# Patient Record
Sex: FEMALE | Race: WHITE | Employment: FULL TIME | URBAN - METROPOLITAN AREA
[De-identification: names, ages, dates, MRNs, and addresses within clinical notes are randomized per-mention and may not be internally consistent; named-entity substitution may affect disease eponyms.]

---

## 2017-03-28 ENCOUNTER — TRANSFERRED RECORDS (OUTPATIENT)
Dept: HEALTH INFORMATION MANAGEMENT | Facility: CLINIC | Age: 47
End: 2017-03-28

## 2017-04-10 ENCOUNTER — TELEPHONE (OUTPATIENT)
Dept: FAMILY MEDICINE | Facility: CLINIC | Age: 47
End: 2017-04-10

## 2017-04-10 DIAGNOSIS — Z86.19 H/O COLD SORES: Primary | ICD-10-CM

## 2017-04-10 RX ORDER — PENCICLOVIR 10 MG/G
CREAM TOPICAL
Refills: 6 | Status: CANCELLED | OUTPATIENT
Start: 2017-04-10

## 2017-04-10 NOTE — TELEPHONE ENCOUNTER
Reason for Call:  Medication or medication refill:    Do you use a [facility]?  Name of the pharmacy and phone number for the current request:  [facility]    Name of the medication requested: Denavir cream, patient states a she feels a cold sore coming on    Other request:     Can we leave a detailed message on this number? YES    Phone number patient can be reached at: Home number on file [phone] (home)    Best Time: any    Call taken on 4/10/2017 at 10:42 AM by Lary Jonas

## 2017-04-11 RX ORDER — PENCICLOVIR 10 MG/G
CREAM TOPICAL
Qty: 1.5 G | Refills: 3 | Status: SHIPPED | OUTPATIENT
Start: 2017-04-11

## 2017-04-11 NOTE — TELEPHONE ENCOUNTER
[facility] calling to state they are not able to get the denavir cream from their supplier.  Please advise  [phone]  Thank you,  Lary Jonas  Patient Representative

## 2017-04-11 NOTE — TELEPHONE ENCOUNTER
Patient has valtrex prescriptions on file.  She should take this as it will be as or more effective than the denavir cream.  Will send this to pharmacy on file, however.  Will have staff notify patient.     Obinna Callahan MD

## 2017-04-11 NOTE — TELEPHONE ENCOUNTER
Called pt and informed her of the below msg. She states that she took all four pills of the Valtrex yesterday but today when she woke up she has another big cold sore on her lip. She want's to know if she should take another dose of the valtrex. She has an interview and would like this gone.  Agnes Jacob MA

## 2017-04-11 NOTE — TELEPHONE ENCOUNTER
Is there anything else patient can use since they are not able to get this cream from their supplier?    Justyna Mcintyre, CMA

## 2017-04-11 NOTE — TELEPHONE ENCOUNTER
[facility] called again and states to disregard their message.  Thank you,  Lary Jonas  Patient Representative

## 2017-05-02 ENCOUNTER — OFFICE VISIT (OUTPATIENT)
Dept: FAMILY MEDICINE | Facility: CLINIC | Age: 47
End: 2017-05-02
Payer: COMMERCIAL

## 2017-05-02 ENCOUNTER — HOSPITAL ENCOUNTER (OUTPATIENT)
Dept: GENERAL RADIOLOGY | Facility: CLINIC | Age: 47
Discharge: HOME | End: 2017-05-02
Attending: FAMILY MEDICINE | Admitting: FAMILY MEDICINE
Payer: COMMERCIAL

## 2017-05-02 VITALS
TEMPERATURE: 98.7 F | SYSTOLIC BLOOD PRESSURE: 116 MMHG | RESPIRATION RATE: 18 BRPM | OXYGEN SATURATION: 98 % | HEIGHT: 65 IN | HEART RATE: 82 BPM | DIASTOLIC BLOOD PRESSURE: 72 MMHG | WEIGHT: 182 LBS | BODY MASS INDEX: 30.32 KG/M2

## 2017-05-02 DIAGNOSIS — S93.492A SPRAIN OF OTHER LIGAMENT OF LEFT ANKLE, INITIAL ENCOUNTER: Primary | ICD-10-CM
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
## 2017-05-02 DIAGNOSIS — F32.5 MAJOR DEPRESSIVE DISORDER WITH SINGLE EPISODE, IN FULL REMISSION (H): ICD-10-CM
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
## 2017-05-02 DIAGNOSIS — I47.29 PAROXYSMAL VENTRICULAR TACHYCARDIA (H): ICD-10-CM

## 2017-05-02 PROCEDURE — 73610 X-RAY EXAM OF ANKLE: CPT | Mod: TC

## 2017-05-02 PROCEDURE — 99213 OFFICE O/P EST LOW 20 MIN: CPT | Performed by: FAMILY MEDICINE

## 2017-05-02 ASSESSMENT — PAIN SCALES - GENERAL: PAINLEVEL: SEVERE PAIN (7)

## 2017-05-02 NOTE — PROGRESS NOTES
SUBJECTIVE:                                                    Sarika VASQUEZ is a 47 year old female who presents to clinic today for the following health issues:    Musculoskeletal problem/pain      Duration: fell at home last night     Description  Location: left ankle swelling, dis colored.     Intensity:  7/10    Accompanying signs and symptoms: swelling and discoloration of ankle    History  Previous similar problem: no   Previous evaluation:  none    Precipitating or alleviating factors:  Trauma or overuse: YES  Aggravating factors include: standing, walking and climbing stairs    Therapies tried and outcome: rest/inactivity, ice, immobilization, support wrap and acetaminophen     She states that she was walking down her stairs into her garage, and missed a step when she was walking. She is able to hobble on the foot, but is very painful to walk on. Shortly after the injury she immediately wrapped it with an ace bandage and placed ice on it frequently.      Endorses she still gets intermittent tachycardia. Just did a Holter monitor through [facility] as recent as March 2017. She almost passed out while she was at work and they wanted her to get accessed further. She did not actually pass out but was very dizzy, diaphoretic, and as though she was going to faint. She was told the preliminary results showed there was no abnormal findings.     Her depression is doing well currently. She has no complaints.     PHQ-9 score:    PHQ-9 SCORE 5/20/2015 7/19/2016 5/2/2017   Total Score 5 - -   Total Score - 3 3     Problem list and histories reviewed & adjusted, as indicated.  Additional history: as documented    Reviewed and updated as needed this visit by clinical staff  Tobacco  Allergies  Meds       Reviewed and updated as needed this visit by Provider       ROS:  CV: As above.   Musculoskeletal: as above    Remaining 10 point ROS of systems including Constitutional, Eyes, Respiratory, Cardiovascular,  "Gastroenterology, Genitourinary, Integumentary, Muscularskeletal, Psychiatric were all negative except for pertinent positives noted in my HPI.    This document serves as a record of the services and decisions personally performed by Obinna Callahan MD. It was created on his/her behalf by José Luis Contreras, a trained medical scribe. The creation of this document is based on the provider's statements to the medical scribe.     José Luis Contreras May 2, 2017 11:03 AM    OBJECTIVE:                                                    /72  Pulse 82  Temp 98.7  F (37.1  C) (Temporal)  Resp 18  Ht 1.651 m (5' 5\")  Wt 82.6 kg (182 lb)  LMP 04/10/2013  SpO2 98%  Breastfeeding? No  BMI 30.29 kg/m2  Body mass index is 30.29 kg/(m^2).  GENERAL APPEARANCE: healthy, alert and no distress  MS: Large amount of swelling on lateral left ankle. Pain with minimal passive range of motion with inversion.   SKIN: bruising noted on the lateral aspect on the Left foot.     XR ANKLE LEFT 3 Views 05/02/17:  ''Preliminary Impression: Question mild lateral ankle sprain. No fracture or  malalignment.''        ASSESSMENT/PLAN:                                                        ICD-10-CM    1. Sprain of other ligament of left ankle, initial encounter S93.492A    2. Fall down stairs, initial encounter W10.8XXA XR Ankle Left G/E 3 Views   3. Paroxysmal ventricular tachycardia (H) I47.2    4. Major depressive disorder with single episode, in full remission (H) F32.5      PLAN:  1.  Advised patient to wear a boot for 7 to 10 days 24 hrs daily including it being in bed. No weight bearing for 3-4 days. When feeling that she can start to put weight on it, then slowly increase weight bearing over the next week until she can feel full weight bearing. She is interested in getting a wheeled scooter for her ankle.     2. Discussed possible or eventual physical therapy for the left ankle. Advised Ibuprofen for pain management now, and " switched to Tylenol after 2-3 days.     Follow-up with provider: Next week for re-evaluation of left ankle.     The information in this document, created by the medical scribe José Luis Contreras for me, accurately reflects the services I personally performed and the decisions made by me. I have reviewed and approved this document for accuracy prior to leaving the patient care area.    Obinna Callahan MD   [facility]

## 2017-05-02 NOTE — NURSING NOTE
"Chief Complaint   Patient presents with     Trauma     Left ankle swelling, Pt missed a step at home last evening and fell, had immediate swelling.        Initial /72  Pulse 82  Temp 98.7  F (37.1  C) (Temporal)  Resp 18  Ht 5' 5\" (1.651 m)  Wt 182 lb (82.6 kg)  LMP 04/10/2013  SpO2 98%  Breastfeeding? No  BMI 30.29 kg/m2 Estimated body mass index is 30.29 kg/(m^2) as calculated from the following:    Height as of this encounter: 5' 5\" (1.651 m).    Weight as of this encounter: 182 lb (82.6 kg).  Medication Reconciliation: complete    "

## 2017-05-02 NOTE — LETTER
REPORT OF WORKABILITY    [address] [address]  Phone: [phone]  Fax: [phone]      Employee Name: Sarika VASQUEZ      : 1970     #: [id number]    Work related injury: No  Employer at time of injury: [employer]  Employer contact & phone: Dulce Lozada  Employed elsewhere? No  Workers' Compensation Carrier/Managed Care Plan:      Today's date: 2017  Date of injury: 2017 in evening at home   Date of first visit: 2017 at 10:00 am.     Provider Evaluation: Please fill in as needed.  Please give copy to employee for employer.    1. Diagnosis: Lateral left ankle sprain     2. Treatment: Wear boot 7-10 days, no weight bearing, Use crutches.     3. Medication:     NOTE: When ordering a medication, [place] Rules require [place] Comp or WC on prescriptions.    4. No work from 2017 to 2017.    5. Return to work date: 2017      WITH RESTRICTIONS? Yes, with work restrictions: * Other: Wearing boot   DURATION OF LIMITATIONS: until she is seen on 17 for a recheck.        RESTRICTIONS: Unlimited unless listed.  Restrictions apply to home and leisure also.  If work restrictions is not available, the employee is totally disabled.    Next appointment: 2017 at 10:00 am     Maximum Medical Improvement (Date):   Any Permanent Partial Disability? 0%  Provider comments:     Medical Examiner Name: Obinna Callahan MD  License or registration: [id number]        CC: Employer, Managed Care Plan/Payor, Patient

## 2017-05-02 NOTE — MR AVS SNAPSHOT
After Visit Summary   5/2/2017    Sarika VASQUEZ    MRN: [id number]           Patient Information     Date Of Birth          1970        Visit Information        Provider Department      5/2/2017 10:30 AM Obinna Callahan MD [facility]        Today's Diagnoses     Sprain of other ligament of left ankle, initial encounter    -  1    Fall down stairs, initial encounter        Paroxysmal ventricular tachycardia (H)        Major depressive disorder with single episode, in full remission (H)           Follow-ups after your visit        Your next 10 appointments already scheduled     May 11, 2017  9:30 AM CDT   MA SCREENING DIGITAL BILATERAL with PHMA1   [facility] ([facility])    [address] [place] [id number] [phone]           Do not use any powder, lotion or deodorant under your arms or on your breast. If you do, we will ask you to remove it before your exam.  Wear comfortable, two-piece clothing.  If you have any allergies, tell your care team.  Bring any previous mammograms from other facilities or have them mailed to the breast center.            May 11, 2017 10:00 AM CDT   Office Visit with Obinna Callahan MD   [facility]    [address] [place] [id number] [phone]           Bring a current list of meds and any records pertaining to this visit.  For Physicals, please bring immunization records and any forms needing to be filled out.  Please arrive 10 minutes early to complete paperwork.              Who to contact     If you have questions or need follow up information about today's clinic visit or your schedule please contact [facility] directly at [phone].  Normal or non-critical lab and imaging results will be communicated to you by MyChart, letter or phone within 4 business days after the clinic has received the results. If  "you do not hear from us within 7 days, please contact the clinic through [email or web address] or phone. If you have a critical or abnormal lab result, we will notify you by phone as soon as possible.  Submit refill requests through [email or web address] or call your pharmacy and they will forward the refill request to us. Please allow 3 business days for your refill to be completed.          Additional Information About Your Visit        [facility] Information     [email or web address] gives you secure access to your electronic health record. If you see a primary care provider, you can also send messages to your care team and make appointments. If you have questions, please call your primary care clinic.  If you do not have a primary care provider, please call [phone] and they will assist you.        Care EveryWhere ID     This is your Care EveryWhere ID. This could be used by other organizations to access your [facility] medical records  [id number]        Your Vitals Were     Pulse Temperature Respirations Height Last Period Pulse Oximetry    82 98.7  F (37.1  C) (Temporal) 18 5' 5\" (1.651 m) 04/10/2013 98%    Breastfeeding? BMI (Body Mass Index)                No 30.29 kg/m2           Blood Pressure from Last 3 Encounters:   05/02/17 116/72   07/19/16 126/72   05/20/15 116/80    Weight from Last 3 Encounters:   05/02/17 182 lb (82.6 kg)   07/19/16 182 lb (82.6 kg)   05/20/15 198 lb (89.8 kg)               Primary Care Provider Office Phone # Fax #    Obinna Callahan [phone]       [facility] [place] [address] [facility]   [facility] [address]        Thank you!     Thank you for choosing [facility]  for your care. Our goal is always to provide you with excellent care. Hearing back from our patients is one way we can continue to improve our services. Please take a few minutes to complete the written survey that you may receive in the mail after your visit with us. Thank you!             Your Updated Medication " List - Protect others around you: Learn how to safely use, store and throw away your medicines at www.disposemymeds.org.          This list is accurate as of: 5/2/17 11:59 PM.  Always use your most recent med list.                   Brand Name Dispense Instructions for use    estradiol 2 MG tablet    ESTRACE    90 tablet    TAKE ONE TABLET BY MOUTH EVERY DAY (PATIENT NEEDS TO BE SEEN FOR FURTHER REFILLS)       penciclovir 1 % cream    DENAVIR    1.5 g    Apply topically every 2 hours (while awake)       prenatal multivitamin  plus iron 27-0.8 MG Tabs per tablet      Take 1 tablet by mouth daily       valACYclovir 1000 mg tablet    VALTREX    4 tablet    Take 2 tablets (2,000 mg) every 12 hours for 1 day.       VITAMIN D (CHOLECALCIFEROL) PO      Take 2,000 Units by mouth daily

## 2017-05-02 NOTE — LETTER
REPORT OF WORKABILITY    [address] [address]  Phone: [phone]  Fax: [phone]      Employee Name: Sarika VASQUEZ      : 1970     #: [id number]    Work related injury: No  Employer at time of injury: [employer]  Employer contact & phone: Dulce Lozada  [phone] and Wendy [phone]  Employed elsewhere? No  Workers' Compensation Carrier/Managed Care Plan:      Today's date: 2017  Date of injury: 2017 in evening at home   Date of first visit: 2017 at 10:00 am.     Provider Evaluation: Please fill in as needed.  Please give copy to employee for employer.    1. Diagnosis: Lateral left ankle sprain     2. Treatment: Wear boot , no weight bearing, Use crutches until 17.     3. Medication:     NOTE: When ordering a medication, [place] Rules require [place] or WC on prescriptions.    4. No work from 2017 to 2017    5. Return to work date: 2017      WITH RESTRICTIONS? Yes, with work restrictions: * Other: Wearing boot, able to walk with boot on, no crutches.   DURATION OF LIMITATIONS: until she is seen on 17 for a recheck.        RESTRICTIONS: Unlimited unless listed.  Restrictions apply to home and leisure also.  If work restrictions is not available, the employee is totally disabled.    Next appointment: 2017 at 10:00 am     Maximum Medical Improvement (Date):   Any Permanent Partial Disability? 0%  Provider comments:     Medical Examiner Name: Obinna Callahan MD  License or registration: [id number]      CC: Employer, Managed Care Plan/Payor, Patient

## 2017-05-03 ASSESSMENT — PATIENT HEALTH QUESTIONNAIRE - PHQ9: SUM OF ALL RESPONSES TO PHQ QUESTIONS 1-9: 3

## 2017-05-11 ENCOUNTER — OFFICE VISIT (OUTPATIENT)
Dept: FAMILY MEDICINE | Facility: CLINIC | Age: 47
End: 2017-05-11
Payer: COMMERCIAL

## 2017-05-11 ENCOUNTER — HOSPITAL ENCOUNTER (OUTPATIENT)
Dept: MAMMOGRAPHY | Facility: CLINIC | Age: 47
Discharge: HOME | End: 2017-05-11
Attending: FAMILY MEDICINE | Admitting: FAMILY MEDICINE
Payer: COMMERCIAL

## 2017-05-11 VITALS
BODY MASS INDEX: 30.32 KG/M2 | RESPIRATION RATE: 18 BRPM | SYSTOLIC BLOOD PRESSURE: 126 MMHG | TEMPERATURE: 98.2 F | DIASTOLIC BLOOD PRESSURE: 70 MMHG | HEIGHT: 65 IN | WEIGHT: 182 LBS | OXYGEN SATURATION: 99 % | HEART RATE: 76 BPM

## 2017-05-11 DIAGNOSIS — Z12.31 VISIT FOR SCREENING MAMMOGRAM: ICD-10-CM

## 2017-05-11 DIAGNOSIS — S93.402A SPRAIN OF LEFT ANKLE, UNSPECIFIED LIGAMENT, INITIAL ENCOUNTER: Primary | ICD-10-CM

## 2017-05-11 PROCEDURE — 99213 OFFICE O/P EST LOW 20 MIN: CPT | Performed by: FAMILY MEDICINE

## 2017-05-11 PROCEDURE — G0202 SCR MAMMO BI INCL CAD: HCPCS

## 2017-05-11 ASSESSMENT — PAIN SCALES - GENERAL: PAINLEVEL: MILD PAIN (2)

## 2017-05-11 NOTE — PROGRESS NOTES
"  SUBJECTIVE:                                                    Sarika VASQUEZ is a 47 year old female who presents to clinic today for the following health issues:      Recheck left ankle today.  She is walking in the boot.  No crutches since Monday 5-8-17.     Problem list and histories reviewed & adjusted, as indicated.  Additional history: as documented    Reviewed and updated as needed this visit by clinical staff  Tobacco  Allergies  Meds  Problems  Soc Hx      Reviewed and updated as needed this visit by Provider  Allergies  Meds  Problems         Here today to follow-up on left ankle sprain.  Was seen 9 days ago and placed in boot and on crutches initially.  Has vastly improved and was able to get to point 3 days ago that she no longer needed crutches.      Ankle starting to feel better and is less swollen.  Still somewhat sore now that boot is off.      ROS:  Negative except as noted above.      OBJECTIVE:                                                    /70  Pulse 76  Temp 98.2  F (36.8  C) (Temporal)  Resp 18  Ht 5' 5\" (1.651 m)  Wt 182 lb (82.6 kg)  LMP 04/10/2013  SpO2 99%  Breastfeeding? No  BMI 30.29 kg/m2  Body mass index is 30.29 kg/(m^2).  GENERAL APPEARANCE: healthy, alert and no distress  MS: left ankle has some swelling along lateral aspect and is tender to palpation below the malleolus.  Pain with inversion of the ankle.  Negative anterior drawer test.  No pain with plantar or dorsiflexion of the foot and 5/5 strength in these directions.            ASSESSMENT/PLAN:                                                        ICD-10-CM    1. Sprain of left ankle, unspecified ligament, initial encounter S93.402A      PLAN:  1.  Will have her use ace wrap and then place gel splint over top of that and continue to walk on it as tolerated with these supports for next few weeks.  Once swelling and pain resolved, may discontinue use of these.    2.  If her ankle feels loose or " unstable, she would benefit from physical therapy.  She can contact me for referral if she wishes to proceed.       Follow up with Provider - as needed.    I spent >15 minutes of face to face time with the patient, >50% of which was spent counseling and coordination of care regarding long term management of ankle sprain.      Obinna Callahan MD   [facility]

## 2017-05-11 NOTE — NURSING NOTE
"Chief Complaint   Patient presents with     RECHECK     ankle       Initial /70  Pulse 76  Temp 98.2  F (36.8  C) (Temporal)  Resp 18  Ht 5' 5\" (1.651 m)  Wt 182 lb (82.6 kg)  LMP 04/10/2013  SpO2 99%  Breastfeeding? No  BMI 30.29 kg/m2 Estimated body mass index is 30.29 kg/(m^2) as calculated from the following:    Height as of this encounter: 5' 5\" (1.651 m).    Weight as of this encounter: 182 lb (82.6 kg).  Medication Reconciliation: complete    "

## 2017-05-11 NOTE — LETTER
REPORT OF WORKABILITY    [address] [address]  Phone: [phone]  Fax: [phone]      Employee Name: Sarika VASQUEZ      : 1970     #: [id number]    Work related injury: No  Employer at time of injury: [employer]  Employer contact & phone: Dulce Lozada  [phone] and Wendy [phone]  Employed elsewhere? No  Workers' Compensation Carrier/Managed Care Plan:      Today's date: May 11, 2017   Date of injury: 2017 in evening at home   Date of first visit: 2017 at 10:00 am.     Provider Evaluation: Please fill in as needed.  Please give copy to employee for employer.    1. Diagnosis: Lateral left ankle sprain     2. Treatment:  Currently required to ace wrap ankle and use gel splint.  Will be able to decrease use of this as tolerated     3. Medication: none    NOTE: When ordering a medication, [place] Rules require [place] Comp or WC on prescriptions.    4. Return to work date: May 11, 2017      WITH RESTRICTIONS? Yes, with work restrictions: needs to be able to wear gel splint while needed.  She can discontinue device as she sees fit.    RESTRICTIONS: Unlimited unless listed.  Restrictions apply to home and leisure also.  If work restrictions is not available, the employee is totally disabled.    Next appointment: as needed     Maximum Medical Improvement (Date): n/a  Any Permanent Partial Disability? 0%  Provider comments: none    Medical Examiner Name: Obinna Callahan MD  License or registration: [id number]      CC: Employer, Managed Care Plan/Payor, Patient

## 2017-05-11 NOTE — MR AVS SNAPSHOT
"              After Visit Summary   5/11/2017    Sarika VASQUEZ    MRN: [id number]           Patient Information     Date Of Birth          1970        Visit Information        Provider Department      5/11/2017 10:00 AM Obinna Callahan MD [facility]        Today's Diagnoses     Sprain of left ankle, unspecified ligament, initial encounter    -  1       Follow-ups after your visit        Who to contact     If you have questions or need follow up information about today's clinic visit or your schedule please contact [facility] directly at [phone].  Normal or non-critical lab and imaging results will be communicated to you by [employer], letter or phone within 4 business days after the clinic has received the results. If you do not hear from us within 7 days, please contact the clinic through Real[employer] or phone. If you have a critical or abnormal lab result, we will notify you by phone as soon as possible.  Submit refill requests through [employer] or call your pharmacy and they will forward the refill request to us. Please allow 3 business days for your refill to be completed.          Additional Information About Your Visit        MyChart Information     [employer] gives you secure access to your electronic health record. If you see a primary care provider, you can also send messages to your care team and make appointments. If you have questions, please call your primary care clinic.  If you do not have a primary care provider, please call [phone] and they will assist you.        Care EveryWhere ID     This is your Care EveryWhere ID. This could be used by other organizations to access your [facility] medical records  [id number]        Your Vitals Were     Pulse Temperature Respirations Height Last Period Pulse Oximetry    76 98.2  F (36.8  C) (Temporal) 18 5' 5\" (1.651 m) 04/10/2013 99%    Breastfeeding? BMI (Body Mass Index)                No 30.29 kg/m2           " Blood Pressure from Last 3 Encounters:   05/11/17 126/70   05/02/17 116/72   07/19/16 126/72    Weight from Last 3 Encounters:   05/11/17 182 lb (82.6 kg)   05/02/17 182 lb (82.6 kg)   07/19/16 182 lb (82.6 kg)              Today, you had the following     No orders found for display       Primary Care Provider Office Phone # Fax #    Obinna Callahan [phone]       [facility] [place] [address] [facility] [address] [place] [address]        Thank you!     Thank you for choosing [facility]  for your care. Our goal is always to provide you with excellent care. Hearing back from our patients is one way we can continue to improve our services. Please take a few minutes to complete the written survey that you may receive in the mail after your visit with us. Thank you!             Your Updated Medication List - Protect others around you: Learn how to safely use, store and throw away your medicines at www.disposemymeds.org.          This list is accurate as of: 5/11/17  2:15 PM.  Always use your most recent med list.                   Brand Name Dispense Instructions for use    estradiol 2 MG tablet    ESTRACE    90 tablet    TAKE ONE TABLET BY MOUTH EVERY DAY (PATIENT NEEDS TO BE SEEN FOR FURTHER REFILLS)       penciclovir 1 % cream    DENAVIR    1.5 g    Apply topically every 2 hours (while awake)       prenatal multivitamin  plus iron 27-0.8 MG Tabs per tablet      Take 1 tablet by mouth daily       valACYclovir 1000 mg tablet    VALTREX    4 tablet    Take 2 tablets (2,000 mg) every 12 hours for 1 day.       VITAMIN D (CHOLECALCIFEROL) PO      Take 2,000 Units by mouth daily

## 2017-06-22 ENCOUNTER — TELEPHONE (OUTPATIENT)
Dept: FAMILY MEDICINE | Facility: CLINIC | Age: 47
End: 2017-06-22

## 2017-06-22 DIAGNOSIS — M25.572 LEFT ANKLE PAIN: Primary | ICD-10-CM

## 2017-06-22 NOTE — TELEPHONE ENCOUNTER
Reason for Call: Request for an order or referral:    Order or referral being requested: MRI. Left ankle pain    Date needed: as soon as possible    Has the patient been seen by the PCP for this problem? YES    Additional comments: pt stated her ins ends June 30    Phone number Patient can be reached at:      Best Time:      Can we leave a detailed message on this number?  YES    Call taken on 6/22/2017 at 2:31 PM by Heather Grider

## 2017-06-22 NOTE — TELEPHONE ENCOUNTER
Per RUDY pt to see Reggie. I notified pt and she can call speciality. Sending to Mitra to put in referral. JAYCEE

## 2017-06-28 ENCOUNTER — RADIANT APPOINTMENT (OUTPATIENT)
Dept: GENERAL RADIOLOGY | Facility: CLINIC | Age: 47
End: 2017-06-28
Attending: PODIATRIST
Payer: COMMERCIAL

## 2017-06-28 ENCOUNTER — OFFICE VISIT (OUTPATIENT)
Dept: PODIATRY | Facility: CLINIC | Age: 47
End: 2017-06-28
Payer: COMMERCIAL

## 2017-06-28 VITALS — WEIGHT: 182 LBS | BODY MASS INDEX: 30.32 KG/M2 | HEIGHT: 65 IN

## 2017-06-28 DIAGNOSIS — S93.402D MODERATE LEFT ANKLE SPRAIN, SUBSEQUENT ENCOUNTER: Primary | ICD-10-CM

## 2017-06-28 DIAGNOSIS — M25.572 LEFT ANKLE PAIN: ICD-10-CM

## 2017-06-28 PROCEDURE — 73610 X-RAY EXAM OF ANKLE: CPT | Mod: LT

## 2017-06-28 PROCEDURE — 99203 OFFICE O/P NEW LOW 30 MIN: CPT | Performed by: PODIATRIST

## 2017-06-28 NOTE — PROGRESS NOTES
PATIENT HISTORY:  Sarika Chew is a 47 year old female who presents to clinic for a painful left ankle.  The patient describes the pain as sharp stabbign.  The patient relates pain is located over the outside of the left ankle.  The patient relates the pain has been present for the past several weeks ever since spraining the left ankle..  The patient relates pain with ambulation.  The patient has tried an over the counter ankle brace with little relief.  The patient was sent by Dr. Obinna Callahan for consultation on the left foot.       REVIEW OF SYSTEMS:  Constitutional, HEENT, cardiovascular, pulmonary, GI, , musculoskeletal, neuro, skin, endocrine and psych systems are negative, except as otherwise noted.     PAST MEDICAL HISTORY:   Past Medical History:   Diagnosis Date     Anxiety      Depressive disorder, not elsewhere classified      HEADACHE 4/24/2006     Hyperlipidaemia      Palpitations      Ulcer (H)      Ventricular tachycardia (H)         PAST SURGICAL HISTORY:   Past Surgical History:   Procedure Laterality Date     CYSTOSCOPY  5/6/2013    Procedure: CYSTOSCOPY;;  Surgeon: Bhaskar Dougherty MD;  [facility]     CYSTOSCOPY N/A 10/2/2014    Procedure: CYSTOSCOPY;  Surgeon: Gomez Jules MD;  [facility]     GYN SURGERY      hysterectomy, total     HC REDUCTION OF LARGE BREAST  2001     HYSTERECTOMY, PAP NO LONGER INDICATED       LAPAROSCOPIC ASSISTED HYSTERECTOMY VAGINAL, BILATERAL SALPINGO-OOPHORECTOMY, COMBINED  5/6/2013    Procedure: COMBINED LAPAROSCOPIC ASSISTED HYSTERECTOMY VAGINAL, SALPINGO-OOPHORECTOMY;  combined laparoscopic assisted hysterectomy vagainal , salpingo oophorectomy with cystoscopy;  Surgeon: Bhaskar Dougherty MD;  [facility]     SLING TRANSVAGINAL N/A 10/2/2014    Procedure: SLING TRANSVAGINAL;  Surgeon: Gomez Jules MD;  [facility]        MEDICATIONS:   Current Outpatient Prescriptions:      order for DME, Trilok Ankle  "Brace, Disp: 1 Device, Rfl: 0     penciclovir (DENAVIR) 1 % cream, Apply topically every 2 hours (while awake), Disp: 1.5 g, Rfl: 3     Prenatal Vit-Fe Fumarate-FA (PRENATAL MULTIVITAMIN  PLUS IRON) 27-0.8 MG TABS, Take 1 tablet by mouth daily, Disp: , Rfl:      VITAMIN D, CHOLECALCIFEROL, PO, Take 2,000 Units by mouth daily, Disp: , Rfl:      valACYclovir (VALTREX) 1000 mg tablet, Take 2 tablets (2,000 mg) every 12 hours for 1 day., Disp: 4 tablet, Rfl: 3     estradiol (ESTRACE) 2 MG tablet, TAKE ONE TABLET BY MOUTH EVERY DAY (PATIENT NEEDS TO BE SEEN FOR FURTHER REFILLS), Disp: 90 tablet, Rfl: 3     ALLERGIES:    Allergies   Allergen Reactions     No Known Drug Allergies         SOCIAL HISTORY:   Social History     Social History     Marital status:      Spouse name: Eros     Number of children: 2     Years of education: N/A     Occupational History     Not on file.     Social History Main Topics     Smoking status: Former Smoker     Packs/day: 1.00     Years: 10.00     Types: Cigarettes     Quit date: 3/26/2013     Smokeless tobacco: Never Used      Comment: 1ppd     Alcohol use Yes      Comment: Once/week     Drug use: No     Sexual activity: Yes     Partners: Male     Birth control/ protection: Surgical      Comment: vas     Other Topics Concern     Not on file     Social History Narrative        FAMILY HISTORY:   Family History   Problem Relation Age of Onset     Adopted: Yes     Unknown/Adopted Mother      Unknown/Adopted Father      Unknown/Adopted Maternal Grandmother      Unknown/Adopted Maternal Grandfather      Unknown/Adopted Paternal Grandmother      Unknown/Adopted Paternal Grandfather      Unknown/Adopted Brother      Unknown/Adopted Sister      Unknown/Adopted Son      Unknown/Adopted Daughter         EXAM:Vitals: Ht 1.651 m (5' 5\")  Wt 82.6 kg (182 lb)  LMP 04/10/2013  BMI 30.29 kg/m2  BMI= Body mass index is 30.29 kg/(m^2).        General appearance: Patient is alert and fully " cooperative with history & exam.  No sign of distress is noted during the visit.     Psychiatric: Affect is pleasant & appropriate.  Patient appears motivated to improve health.     Respiratory: Breathing is regular & unlabored while sitting.     HEENT: Hearing is intact to spoken word.  Speech is clear.  No gross evidence of visual impairment that would impact ambulation.     Dermatologic: Skin is intact to both lower extremities without significant lesions, rash or abrasion.  No paronychia or evidence of soft tissue infection is noted.     Vascular: DP & PT pulses are intact & regular bilaterally.  No significant edema or varicosities noted.  CFT and skin temperature is normal to both lower extremities.     Neurologic: Lower extremity sensation is intact to light touch.  No evidence of weakness or contracture in the lower extremities.  No evidence of neuropathy.     Musculoskeletal: Patient is ambulatory without assistive device or brace.  No gross ankle deformity noted.  No foot or ankle joint effusion is noted.  Noted pain on palpation over the Anterior Talofibular ligament on the left ankle.  Moderated edema noted over the area.  No ecchymosis noted.    Radiographs were evaluated including AP, lateral and medial oblique views of the left ankle reveals no cortical erosions or periosteal elevation.  All joint margins appear stable.  There is no apparent fracture or tumor formation noted.  There is no evidence of foreign body.    ASSESSMENT / PLAN:     ICD-10-CM    1. Moderate left ankle sprain, subsequent encounter S93.402D order for DME       I have explained to Sarika  about the conditions.  We discussed the nature of the condition as well as the treatment plan and expected length of recovery.  At this time, the patient was instructed on icing, stretching, tissue massage and support.  The patient was fitted with a Trilok ankle brace that will aid in offloading the tension forces to the soft tissues and prevent  further inflammation.   The patient will return in four weeks for reevaluation if the symptoms do not resolve.          Disclaimer: This note consists of symbols derived from keyboarding, dictation and/or voice recognition software. As a result, there may be errors in the script that have gone undetected. Please consider this when interpreting information found in this chart.       RADHA Tompkins D.P.M., [email or web address].[email or web address].

## 2017-06-28 NOTE — MR AVS SNAPSHOT
After Visit Summary   6/28/2017    Sarika VASQUEZ    MRN: [id number]           Patient Information     Date Of Birth          1970        Visit Information        Provider Department      6/28/2017 11:40 AM Wilmer Tompkins DPM [facility]        Today's Diagnoses     Moderate left ankle sprain, subsequent encounter    -  1      Care Instructions    Initial musculoskeletal treatment recommendation:    1.  Stretch the calf muscles as instructed once an hour.  2.  Ice the injured area in the evening; 20 min on/off.  3.  Take antiinflammatory medication as directed.  4.  Massage the soft tissues around the injured area in the morning to loosen the tissue  5.  Wear supportive foot wear    If no improvement in symptoms within four to six weeks, return to clinic for reevaluation.            Follow-ups after your visit        Follow-up notes from your care team     Return in about 4 weeks (around 7/26/2017), or if symptoms worsen or fail to improve.      Who to contact     If you have questions or need follow up information about today's clinic visit or your schedule please contact [facility] directly at [phone].  Normal or non-critical lab and imaging results will be communicated to you by [employer], letter or phone within 4 business days after the clinic has received the results. If you do not hear from us within 7 days, please contact the clinic through [employer] or phone. If you have a critical or abnormal lab result, we will notify you by phone as soon as possible.  Submit refill requests through [employer] or call your pharmacy and they will forward the refill request to us. Please allow 3 business days for your refill to be completed.          Additional Information About Your Visit        MyChart Information     [employer] gives you secure access to your electronic health record. If you see a primary care provider, you can also send messages to your care  "team and make appointments. If you have questions, please call your primary care clinic.  If you do not have a primary care provider, please call [phone] and they will assist you.        Care EveryWhere ID     This is your Care EveryWhere ID. This could be used by other organizations to access your [place] medical records  [id number]        Your Vitals Were     Height Last Period BMI (Body Mass Index)             1.651 m (5' 5\") 04/10/2013 30.29 kg/m2          Blood Pressure from Last 3 Encounters:   05/11/17 126/70   05/02/17 116/72   07/19/16 126/72    Weight from Last 3 Encounters:   06/28/17 82.6 kg (182 lb)   05/11/17 82.6 kg (182 lb)   05/02/17 82.6 kg (182 lb)                 Today's Medication Changes          These changes are accurate as of: 6/28/17 12:17 PM.  If you have any questions, ask your nurse or doctor.               Start taking these medicines.        Dose/Directions    order for DME   Used for:  Moderate left ankle sprain, subsequent encounter   Started by:  Wilmer Tompkins DPM        Trilok Ankle Brace   Quantity:  1 Device   Refills:  0            Where to get your medicines      Some of these will need a paper prescription and others can be bought over the counter.  Ask your nurse if you have questions.     Bring a paper prescription for each of these medications     order for DME                Primary Care Provider Office Phone # Fax #    Obinna Wilmer Callahan [phone]       [address]   [facility] [address]        Equal Access to Services     [facility] GEOVANY AH: Hadii ricki fontana hadasho Soomaali, waaxda luqadaha, qaybta kaalmada adeegyada, merritt vega. So [facility] [phone].    ATENCIÓN: Si habla español, tiene a calderón disposición servicios gratuitos de asistencia lingüística. Llame al [phone].    We comply with applicable federal civil rights laws and [place] laws. We do not discriminate on the basis " of race, color, national origin, age, disability sex, sexual orientation or gender identity.            Thank you!     Thank you for choosing [facility]  for your care. Our goal is always to provide you with excellent care. Hearing back from our patients is one way we can continue to improve our services. Please take a few minutes to complete the written survey that you may receive in the mail after your visit with us. Thank you!             Your Updated Medication List - Protect others around you: Learn how to safely use, store and throw away your medicines at www.disposemymeds.org.          This list is accurate as of: 6/28/17 12:17 PM.  Always use your most recent med list.                   Brand Name Dispense Instructions for use Diagnosis    estradiol 2 MG tablet    ESTRACE    90 tablet    TAKE ONE TABLET BY MOUTH EVERY DAY (PATIENT NEEDS TO BE SEEN FOR FURTHER REFILLS)    Symptomatic menopausal or female climacteric states       order for DME     1 Device    Trilok Ankle Brace    Moderate left ankle sprain, subsequent encounter       penciclovir 1 % cream    DENAVIR    1.5 g    Apply topically every 2 hours (while awake)    H/O cold sores       prenatal multivitamin  plus iron 27-0.8 MG Tabs per tablet      Take 1 tablet by mouth daily        valACYclovir 1000 mg tablet    VALTREX    4 tablet    Take 2 tablets (2,000 mg) every 12 hours for 1 day.    H/O cold sores       VITAMIN D (CHOLECALCIFEROL) PO      Take 2,000 Units by mouth daily

## 2017-06-28 NOTE — LETTER
6/28/2017         RE: Sarika CHEW  [address] [facility] [address]        Dear Colleague,    Thank you for referring your patient, Sarika CHEW, to the [facility]. Please see a copy of my visit note below.    PATIENT HISTORY:  Sarika Chew is a 47 year old female who presents to clinic for a painful left ankle.  The patient describes the pain as sharp stabbign.  The patient relates pain is located over the outside of the left ankle.  The patient relates the pain has been present for the past several weeks ever since spraining the left ankle..  The patient relates pain with ambulation.  The patient has tried an over the counter ankle brace with little relief.  The patient was sent by Dr. Obinna Callahan for consultation on the left foot.       REVIEW OF SYSTEMS:  Constitutional, HEENT, cardiovascular, pulmonary, GI, , musculoskeletal, neuro, skin, endocrine and psych systems are negative, except as otherwise noted.     PAST MEDICAL HISTORY:   Past Medical History:   Diagnosis Date     Anxiety      Depressive disorder, not elsewhere classified      HEADACHE 4/24/2006     Hyperlipidaemia      Palpitations      Ulcer (H)      Ventricular tachycardia (H)         PAST SURGICAL HISTORY:   Past Surgical History:   Procedure Laterality Date     CYSTOSCOPY  5/6/2013    Procedure: CYSTOSCOPY;;  Surgeon: Bhaskar Dougherty MD;  [facility]     CYSTOSCOPY N/A 10/2/2014    Procedure: CYSTOSCOPY;  Surgeon: Gomez Jules MD;  [facility]     GYN SURGERY      hysterectomy, total     HC REDUCTION OF LARGE BREAST  2001     HYSTERECTOMY, PAP NO LONGER INDICATED       LAPAROSCOPIC ASSISTED HYSTERECTOMY VAGINAL, BILATERAL SALPINGO-OOPHORECTOMY, COMBINED  5/6/2013    Procedure: COMBINED LAPAROSCOPIC ASSISTED HYSTERECTOMY VAGINAL, SALPINGO-OOPHORECTOMY;  combined laparoscopic assisted hysterectomy vagainal , salpingo oophorectomy with cystoscopy;   Surgeon: Bhaskar Dougherty MD;  [facility]     SLING TRANSVAGINAL N/A 10/2/2014    Procedure: SLING TRANSVAGINAL;  Surgeon: Gomez Jules MD;  [facility]        MEDICATIONS:   Current Outpatient Prescriptions:      order for DME, Trilok Ankle Brace, Disp: 1 Device, Rfl: 0     penciclovir (DENAVIR) 1 % cream, Apply topically every 2 hours (while awake), Disp: 1.5 g, Rfl: 3     Prenatal Vit-Fe Fumarate-FA (PRENATAL MULTIVITAMIN  PLUS IRON) 27-0.8 MG TABS, Take 1 tablet by mouth daily, Disp: , Rfl:      VITAMIN D, CHOLECALCIFEROL, PO, Take 2,000 Units by mouth daily, Disp: , Rfl:      valACYclovir (VALTREX) 1000 mg tablet, Take 2 tablets (2,000 mg) every 12 hours for 1 day., Disp: 4 tablet, Rfl: 3     estradiol (ESTRACE) 2 MG tablet, TAKE ONE TABLET BY MOUTH EVERY DAY (PATIENT NEEDS TO BE SEEN FOR FURTHER REFILLS), Disp: 90 tablet, Rfl: 3     ALLERGIES:    Allergies   Allergen Reactions     No Known Drug Allergies         SOCIAL HISTORY:   Social History     Social History     Marital status:      Spouse name: Eros     Number of children: 2     Years of education: N/A     Occupational History     Not on file.     Social History Main Topics     Smoking status: Former Smoker     Packs/day: 1.00     Years: 10.00     Types: Cigarettes     Quit date: 3/26/2013     Smokeless tobacco: Never Used      Comment: 1ppd     Alcohol use Yes      Comment: Once/week     Drug use: No     Sexual activity: Yes     Partners: Male     Birth control/ protection: Surgical      Comment: vas     Other Topics Concern     Not on file     Social History Narrative        FAMILY HISTORY:   Family History   Problem Relation Age of Onset     Adopted: Yes     Unknown/Adopted Mother      Unknown/Adopted Father      Unknown/Adopted Maternal Grandmother      Unknown/Adopted Maternal Grandfather      Unknown/Adopted Paternal Grandmother      Unknown/Adopted Paternal Grandfather      Unknown/Adopted Brother       "Unknown/Adopted Sister      Unknown/Adopted Son      Unknown/Adopted Daughter         EXAM:Vitals: Ht 1.651 m (5' 5\")  Wt 82.6 kg (182 lb)  LMP 04/10/2013  BMI 30.29 kg/m2  BMI= Body mass index is 30.29 kg/(m^2).        General appearance: Patient is alert and fully cooperative with history & exam.  No sign of distress is noted during the visit.     Psychiatric: Affect is pleasant & appropriate.  Patient appears motivated to improve health.     Respiratory: Breathing is regular & unlabored while sitting.     HEENT: Hearing is intact to spoken word.  Speech is clear.  No gross evidence of visual impairment that would impact ambulation.     Dermatologic: Skin is intact to both lower extremities without significant lesions, rash or abrasion.  No paronychia or evidence of soft tissue infection is noted.     Vascular: DP & PT pulses are intact & regular bilaterally.  No significant edema or varicosities noted.  CFT and skin temperature is normal to both lower extremities.     Neurologic: Lower extremity sensation is intact to light touch.  No evidence of weakness or contracture in the lower extremities.  No evidence of neuropathy.     Musculoskeletal: Patient is ambulatory without assistive device or brace.  No gross ankle deformity noted.  No foot or ankle joint effusion is noted.  Noted pain on palpation over the Anterior Talofibular ligament on the left ankle.  Moderated edema noted over the area.  No ecchymosis noted.    Radiographs were evaluated including AP, lateral and medial oblique views of the left ankle reveals no cortical erosions or periosteal elevation.  All joint margins appear stable.  There is no apparent fracture or tumor formation noted.  There is no evidence of foreign body.    ASSESSMENT / PLAN:     ICD-10-CM    1. Moderate left ankle sprain, subsequent encounter S93.402D order for DME       I have explained to Sarika  about the conditions.  We discussed the nature of the condition as well as the " treatment plan and expected length of recovery.  At this time, the patient was instructed on icing, stretching, tissue massage and support.  The patient was fitted with a Trilok ankle brace that will aid in offloading the tension forces to the soft tissues and prevent further inflammation.   The patient will return in four weeks for reevaluation if the symptoms do not resolve.          Disclaimer: This note consists of symbols derived from keyboarding, dictation and/or voice recognition software. As a result, there may be errors in the script that have gone undetected. Please consider this when interpreting information found in this chart.       RADHA Tompkins D.P.M., F.CK.C.F.A.S.        Again, thank you for allowing me to participate in the care of your patient.        Sincerely,        Wilmer Tompkins DPM

## 2017-11-09 ENCOUNTER — TELEPHONE (OUTPATIENT)
Dept: FAMILY MEDICINE | Facility: CLINIC | Age: 47
End: 2017-11-09

## 2017-11-09 NOTE — TELEPHONE ENCOUNTER
Note faed to employer at [employer] to be off thru Monday. At [phone]. Per RUDY. Pt has appt with RUDY on 11-13 with RUDY.  KH

## 2017-11-09 NOTE — TELEPHONE ENCOUNTER
Reason for Call:  Same Day Appointment, Requested Provider:  Obinna Callahan M.D.    PCP: Obinna Callahan    Reason for visit: work in-patient is calling stating that she NEEDS to be seen today for extreme workplace harrassment. Patient states that this has excalated and was sent home from work. She states that she needs to be put on some sort of medication and have it documented    Duration of symptoms: ongoing     Have you been treated for this in the past? No    Additional comments:     Can we leave a detailed message on this number? YES    Phone number patient can be reached at: Home number on file [phone] (home)    Best Time: any    Call taken on 11/9/2017 at 10:08 AM by Pretty Teresa

## 2017-11-13 ENCOUNTER — OFFICE VISIT (OUTPATIENT)
Dept: FAMILY MEDICINE | Facility: CLINIC | Age: 47
End: 2017-11-13
Payer: COMMERCIAL

## 2017-11-13 VITALS
OXYGEN SATURATION: 98 % | BODY MASS INDEX: 31.16 KG/M2 | SYSTOLIC BLOOD PRESSURE: 134 MMHG | HEART RATE: 114 BPM | TEMPERATURE: 98.2 F | HEIGHT: 65 IN | DIASTOLIC BLOOD PRESSURE: 72 MMHG | WEIGHT: 187 LBS | RESPIRATION RATE: 18 BRPM

## 2017-11-13 DIAGNOSIS — F32.5 MAJOR DEPRESSIVE DISORDER WITH SINGLE EPISODE, IN FULL REMISSION (H): ICD-10-CM

## 2017-11-13 DIAGNOSIS — F41.8 SITUATIONAL ANXIETY: Primary | ICD-10-CM

## 2017-11-13 DIAGNOSIS — G43.109 MIGRAINE WITH AURA AND WITHOUT STATUS MIGRAINOSUS, NOT INTRACTABLE: ICD-10-CM

## 2017-11-13 PROCEDURE — 99214 OFFICE O/P EST MOD 30 MIN: CPT | Performed by: FAMILY MEDICINE

## 2017-11-13 RX ORDER — SUMATRIPTAN 100 MG/1
100 TABLET, FILM COATED ORAL
Qty: 18 TABLET | Refills: 3 | Status: SHIPPED | OUTPATIENT
Start: 2017-11-13 | End: 2018-09-10

## 2017-11-13 ASSESSMENT — PAIN SCALES - GENERAL: PAINLEVEL: NO PAIN (0)

## 2017-11-13 ASSESSMENT — PATIENT HEALTH QUESTIONNAIRE - PHQ9: SUM OF ALL RESPONSES TO PHQ QUESTIONS 1-9: 10

## 2017-11-13 NOTE — LETTER
November 13, 2017    To Whom it may Concern:    Sarika VASQUEZ was seen today in clinic.  She will be unable to work until 11/17/2017.      If you have any further questions, please contact me at the number above.    Sincerely,        Obinna Callahan MD

## 2017-11-13 NOTE — LETTER
[address] [address]  [phone]        November 9, 2017    Sarika CHEW  [address] [place] [id number]           To whom it may concern :      Please excuse Dulce Chew from work thru Monday November 13th.      Sincerely,        Obinna Callahan M.D.

## 2017-11-13 NOTE — MR AVS SNAPSHOT
After Visit Summary   11/13/2017    Sarika VASQUEZ    MRN: [id number]           Patient Information     Date Of Birth          1970        Visit Information        Provider Department      11/13/2017 8:30 AM Obinna Callahan MD [facility]        Today's Diagnoses     Situational anxiety    -  1    Migraine with aura and without status migrainosus, not intractable        Major depressive disorder with single episode, in full remission (H)           Follow-ups after your visit        Who to contact     If you have questions or need follow up information about today's clinic visit or your schedule please contact [facility] directly at [phone].  Normal or non-critical lab and imaging results will be communicated to you by MyChart, letter or phone within 4 business days after the clinic has received the results. If you do not hear from us within 7 days, please contact the clinic through [employer] or phone. If you have a critical or abnormal lab result, we will notify you by phone as soon as possible.  Submit refill requests through [employer] or call your pharmacy and they will forward the refill request to us. Please allow 3 business days for your refill to be completed.          Additional Information About Your Visit        MyChart Information     [employer] gives you secure access to your electronic health record. If you see a primary care provider, you can also send messages to your care team and make appointments. If you have questions, please call your primary care clinic.  If you do not have a primary care provider, please call [phone] and they will assist you.        Care EveryWhere ID     This is your Care EveryWhere ID. This could be used by other organizations to access your [place] medical records  [id number]        Your Vitals Were     Pulse Temperature Respirations Height Last Period Pulse Oximetry    114 98.2  F (36.8  C) (Temporal) 18 5'  "5\" (1.651 m) 04/10/2013 98%    Breastfeeding? BMI (Body Mass Index)                No 31.12 kg/m2           Blood Pressure from Last 3 Encounters:   11/13/17 134/72   05/11/17 126/70   05/02/17 116/72    Weight from Last 3 Encounters:   11/13/17 187 lb (84.8 kg)   06/28/17 182 lb (82.6 kg)   05/11/17 182 lb (82.6 kg)              We Performed the Following     DEPRESSION ACTION PLAN (DAP)          Today's Medication Changes          These changes are accurate as of: 11/13/17  4:58 PM.  If you have any questions, ask your nurse or doctor.               Start taking these medicines.        Dose/Directions    SUMAtriptan 100 MG tablet   Commonly known as:  IMITREX   Used for:  Migraine with aura and without status migrainosus, not intractable   Started by:  Obinna Callahan MD        Dose:  100 mg   Take 1 tablet (100 mg) by mouth at onset of headache for migraine May repeat in 2 hours. Max 2 tablets/24 hours.   Quantity:  18 tablet   Refills:  3         Stop taking these medicines if you haven't already. Please contact your care team if you have questions.     order for DME   Stopped by:  Obinna Callahan MD                Where to get your medicines      These medications were sent to [id number] - [place], [place] - [address]  [address] [address]     Phone:  [phone]     SUMAtriptan 100 MG tablet                Primary Care Provider Office Phone # Fax #    Obinna Callahan [phone]       [address] [place] [address]        Equal Access to Services     [facility] AH: Hadii aad ku hadasho Soomaali, waaxda luqadaha, qaybta kaalmada adeegyada, merritt vega. So [facility] [phone].    ATENCIÓN: Si habla español, tiene a calderón disposición servicios gratuitos de asistencia lingüística. Llame al [phone].    We comply with applicable federal civil rights laws and [place] laws. We do not discriminate on the " basis of race, color, national origin, age, disability, sex, sexual orientation, or gender identity.            Thank you!     Thank you for choosing [facility]  for your care. Our goal is always to provide you with excellent care. Hearing back from our patients is one way we can continue to improve our services. Please take a few minutes to complete the written survey that you may receive in the mail after your visit with us. Thank you!             Your Updated Medication List - Protect others around you: Learn how to safely use, store and throw away your medicines at www.disposemymeds.org.          This list is accurate as of: 11/13/17  4:58 PM.  Always use your most recent med list.                   Brand Name Dispense Instructions for use Diagnosis    estradiol 2 MG tablet    ESTRACE    90 tablet    TAKE ONE TABLET BY MOUTH EVERY DAY (PATIENT NEEDS TO BE SEEN FOR FURTHER REFILLS)    Symptomatic menopausal or female climacteric states       penciclovir 1 % cream    DENAVIR    1.5 g    Apply topically every 2 hours (while awake)    H/O cold sores       prenatal multivitamin plus iron 27-0.8 MG Tabs per tablet      Take 1 tablet by mouth daily        SUMAtriptan 100 MG tablet    IMITREX    18 tablet    Take 1 tablet (100 mg) by mouth at onset of headache for migraine May repeat in 2 hours. Max 2 tablets/24 hours.    Migraine with aura and without status migrainosus, not intractable       valACYclovir 1000 mg tablet    VALTREX    4 tablet    Take 2 tablets (2,000 mg) every 12 hours for 1 day.    H/O cold sores       VITAMIN D (CHOLECALCIFEROL) PO      Take 2,000 Units by mouth daily

## 2017-11-13 NOTE — NURSING NOTE
"Chief Complaint   Patient presents with     Anxiety     work issues, needs note      Depression       Initial /72  Pulse 114  Temp 98.2  F (36.8  C) (Temporal)  Resp 18  Ht 5' 5\" (1.651 m)  Wt 187 lb (84.8 kg)  LMP 04/10/2013  SpO2 98%  Breastfeeding? No  BMI 31.12 kg/m2 Estimated body mass index is 31.12 kg/(m^2) as calculated from the following:    Height as of this encounter: 5' 5\" (1.651 m).    Weight as of this encounter: 187 lb (84.8 kg).  Medication Reconciliation: complete    "

## 2017-11-13 NOTE — PROGRESS NOTES
SUBJECTIVE:   Sarika VASQUEZ is a 47 year old female who presents to clinic today for the following health issues:      Depression and Anxiety Follow-Up    Status since last visit: Worsened     Other associated symptoms:work issues, harassment     Complicating factors:     Significant life event: No     Current substance abuse: None    PHQ-9 Score and MyChart F/U Questions 7/19/2016 5/2/2017   Total Score 3 3   Q9: Suicide Ideation Not at all Not at all     HUMBERTO-7 SCORE 7/10/2014 5/21/2015 7/19/2016   Total Score 5 2 -   Total Score - - 4     PHQ-9  English  PHQ-9   Any Language  GAD7  Suicide Assessment Five-step Evaluation and Treatment (SAFE-T)      Amount of exercise or physical activity: None    Problems taking medications regularly: No    Medication side effects: none    Diet: regular (no restrictions)    Problem list and histories reviewed & adjusted, as indicated.  Additional history: as documented    Reviewed and updated as needed this visit by clinical staffTobacco  Allergies  Meds  Problems  Med Hx  Surg Hx  Fam Hx  Soc Hx        Reviewed and updated as needed this visit by Provider  Allergies  Meds  Problems         Lots of issues at work.  Is feeling like she is under appreciated and being pushed around at work.  Lots of division at work with other co-workers.  Has been taking some concerns to management and she gets pushed back from them.      Having diarrhea, vomiting.  Having heart palpitations.  Sleep is terrible.  Difficult falling asleep.  Waking up early and can't go back to sleep.      Pulled out ativan for 2 years ago and took it due to the stress at work.      Has done therapy in the past.  Has been pulling out those learned skills recently and they have only been helping so much.      This past weekend was good, except the dread of having to go back to work is weighing on her.      History of migraines. Gets relief from Imitrex and naproxen combo.  Usually gets 1-2 headaches  "month.  Now getting them daily while at work.      ROS:  10 point ROS of systems including Constitutional, Eyes, Respiratory, Cardiovascular, Gastroenterology, Genitourinary, Integumentary, Muscularskeletal, Psychiatric were all negative except for pertinent positives noted in my HPI.     OBJECTIVE:                                                    /72  Pulse 114  Temp 98.2  F (36.8  C) (Temporal)  Resp 18  Ht 5' 5\" (1.651 m)  Wt 187 lb (84.8 kg)  LMP 04/10/2013  SpO2 98%  Breastfeeding? No  BMI 31.12 kg/m2  Body mass index is 31.12 kg/(m^2).  GENERAL APPEARANCE: alert, no distress and obese  NECK: no adenopathy, no asymmetry, masses, or scars and thyroid normal to palpation  RESP: lungs clear to auscultation - no rales, rhonchi or wheezes  CV: regular rates and rhythm, normal S1 S2, no S3 or S4 and no murmur, click or rub  NEURO: Normal strength and tone, mentation intact, speech normal and DTR symmetrically normal in lower extremities  PSYCH: mentation appears normal, anxious, crying and worried          ASSESSMENT/PLAN:                                                        ICD-10-CM    1. Situational anxiety F41.8    2. Migraine with aura and without status migrainosus, not intractable G43.109 SUMAtriptan (IMITREX) 100 MG tablet   3. Major depressive disorder with single episode, in full remission (H) F32.5      PLAN:  1.  Patient has increased anxiety and this all seems work related.  Doing well outside of stressful situation.  She is actively looking for alternative employment and seems to be handling this well.  She is concerned about her emotional state if she has to return over the next few days and so will have her stay out of work for next few days.  She has ativan at home that she will continue to use as needed.  I did tell her that if she is needing this frequently, we should discuss alternative medication.  2.  Imitrex refilled.  She has naproxen at home.    3.  Depression symptoms I " feel are still in remission and we can revisit this once she is out of this work situation.     Follow up with Provider - as needed     I spent >25 minutes of face to face time with the patient, >50% of which was spent counseling and coordination of care regarding situational anxiety.      Obinna Callahan MD   [facility]

## 2018-06-20 ENCOUNTER — TELEPHONE (OUTPATIENT)
Dept: FAMILY MEDICINE | Facility: CLINIC | Age: 48
End: 2018-06-20

## 2018-06-20 NOTE — TELEPHONE ENCOUNTER
Patient is due for a PHQ-9.  Index start date:3/13/2018  Index end date:7/13/2018    Please call patient. Pt is active on [employer]. I have sent PHQ-9 via [employer] and will postpone encounter. Mirlande Pascual CMA (AAMA)

## 2018-07-13 ENCOUNTER — TELEPHONE (OUTPATIENT)
Dept: FAMILY MEDICINE | Facility: CLINIC | Age: 48
End: 2018-07-13

## 2018-07-13 NOTE — TELEPHONE ENCOUNTER
Reason for Call:  Same Day Appointment, Requested Provider:  Obinna Callahan M.D.    PCP: Obinna Callahan    Reason for visit: Anxiety    Duration of symptoms: ongoing    Have you been treated for this in the past? Yes    Additional comments:     Can we leave a detailed message on this number? YES    Phone number patient can be reached at: Home number on file [phone] (home)    Best Time:     Call taken on 7/13/2018 at 7:53 AM by Lary Jonas

## 2018-07-16 ENCOUNTER — OFFICE VISIT (OUTPATIENT)
Dept: FAMILY MEDICINE | Facility: CLINIC | Age: 48
End: 2018-07-16
Payer: COMMERCIAL

## 2018-07-16 VITALS
TEMPERATURE: 98.4 F | OXYGEN SATURATION: 97 % | BODY MASS INDEX: 30.82 KG/M2 | WEIGHT: 185 LBS | HEIGHT: 65 IN | RESPIRATION RATE: 18 BRPM | SYSTOLIC BLOOD PRESSURE: 122 MMHG | HEART RATE: 100 BPM | DIASTOLIC BLOOD PRESSURE: 70 MMHG

## 2018-07-16 DIAGNOSIS — Z83.3 FAMILY HISTORY OF DIABETES MELLITUS: ICD-10-CM

## 2018-07-16 DIAGNOSIS — F41.1 GAD (GENERALIZED ANXIETY DISORDER): Primary | ICD-10-CM

## 2018-07-16 DIAGNOSIS — N95.1 SYMPTOMATIC MENOPAUSAL OR FEMALE CLIMACTERIC STATES: ICD-10-CM

## 2018-07-16 DIAGNOSIS — Z13.220 LIPID SCREENING: ICD-10-CM

## 2018-07-16 LAB
CHOLEST SERPL-MCNC: 269 MG/DL
GLUCOSE SERPL-MCNC: 95 MG/DL (ref 70–99)
HDLC SERPL-MCNC: 48 MG/DL
HGB BLD-MCNC: 13.1 G/DL (ref 11.7–15.7)
LDLC SERPL CALC-MCNC: 171 MG/DL
NONHDLC SERPL-MCNC: 221 MG/DL
TRIGL SERPL-MCNC: 252 MG/DL
TSH SERPL DL<=0.005 MIU/L-ACNC: 1.52 MU/L (ref 0.4–4)

## 2018-07-16 PROCEDURE — 36415 COLL VENOUS BLD VENIPUNCTURE: CPT | Performed by: FAMILY MEDICINE

## 2018-07-16 PROCEDURE — 82306 VITAMIN D 25 HYDROXY: CPT | Performed by: FAMILY MEDICINE

## 2018-07-16 PROCEDURE — 80061 LIPID PANEL: CPT | Performed by: FAMILY MEDICINE

## 2018-07-16 PROCEDURE — 85018 HEMOGLOBIN: CPT | Performed by: FAMILY MEDICINE

## 2018-07-16 PROCEDURE — 99214 OFFICE O/P EST MOD 30 MIN: CPT | Performed by: FAMILY MEDICINE

## 2018-07-16 PROCEDURE — 84443 ASSAY THYROID STIM HORMONE: CPT | Performed by: FAMILY MEDICINE

## 2018-07-16 PROCEDURE — 82947 ASSAY GLUCOSE BLOOD QUANT: CPT | Performed by: FAMILY MEDICINE

## 2018-07-16 RX ORDER — CITALOPRAM HYDROBROMIDE 20 MG/1
20 TABLET ORAL DAILY
Qty: 30 TABLET | Refills: 1 | Status: SHIPPED | OUTPATIENT
Start: 2018-07-16 | End: 2018-07-16

## 2018-07-16 RX ORDER — HYDROXYZINE HYDROCHLORIDE 25 MG/1
25-50 TABLET, FILM COATED ORAL EVERY 6 HOURS PRN
Qty: 30 TABLET | Refills: 1 | Status: SHIPPED | OUTPATIENT
Start: 2018-07-16 | End: 2018-09-10

## 2018-07-16 RX ORDER — ESTRADIOL 1 MG/1
1 TABLET ORAL DAILY
Qty: 90 TABLET | Refills: 3 | Status: SHIPPED | OUTPATIENT
Start: 2018-07-16 | End: 2019-10-04

## 2018-07-16 RX ORDER — CITALOPRAM HYDROBROMIDE 20 MG/1
20 TABLET ORAL DAILY
Qty: 30 TABLET | Refills: 1 | Status: SHIPPED | OUTPATIENT
Start: 2018-07-16 | End: 2018-08-14

## 2018-07-16 RX ORDER — HYDROXYZINE HYDROCHLORIDE 25 MG/1
25-50 TABLET, FILM COATED ORAL EVERY 6 HOURS PRN
Qty: 30 TABLET | Refills: 1 | Status: SHIPPED | OUTPATIENT
Start: 2018-07-16 | End: 2018-07-16

## 2018-07-16 RX ORDER — ESTRADIOL 1 MG/1
1 TABLET ORAL DAILY
Qty: 90 TABLET | Refills: 3 | Status: SHIPPED | OUTPATIENT
Start: 2018-07-16 | End: 2018-07-16

## 2018-07-16 ASSESSMENT — ANXIETY QUESTIONNAIRES
5. BEING SO RESTLESS THAT IT IS HARD TO SIT STILL: SEVERAL DAYS
GAD7 TOTAL SCORE: 17
7. FEELING AFRAID AS IF SOMETHING AWFUL MIGHT HAPPEN: MORE THAN HALF THE DAYS
2. NOT BEING ABLE TO STOP OR CONTROL WORRYING: NEARLY EVERY DAY
IF YOU CHECKED OFF ANY PROBLEMS ON THIS QUESTIONNAIRE, HOW DIFFICULT HAVE THESE PROBLEMS MADE IT FOR YOU TO DO YOUR WORK, TAKE CARE OF THINGS AT HOME, OR GET ALONG WITH OTHER PEOPLE: NOT DIFFICULT AT ALL
6. BECOMING EASILY ANNOYED OR IRRITABLE: NEARLY EVERY DAY
3. WORRYING TOO MUCH ABOUT DIFFERENT THINGS: NEARLY EVERY DAY
1. FEELING NERVOUS, ANXIOUS, OR ON EDGE: NEARLY EVERY DAY

## 2018-07-16 ASSESSMENT — PAIN SCALES - GENERAL: PAINLEVEL: NO PAIN (0)

## 2018-07-16 ASSESSMENT — PATIENT HEALTH QUESTIONNAIRE - PHQ9: 5. POOR APPETITE OR OVEREATING: MORE THAN HALF THE DAYS

## 2018-07-16 NOTE — PROGRESS NOTES
SUBJECTIVE:   Sarika VASQUEZ is a 48 year old female who presents to clinic today for the following health issues:      Depression and Anxiety Follow-Up    Status since last visit: Worsened anxiety is worse.     Other associated symptoms:None    Complicating factors: selling house, finding bilogical family. SO working out of state.     Significant life event: No     Current substance abuse: None    PHQ-9 2017   Total Score 3 3 10   Q9: Suicide Ideation Not at all Not at all Not at all     HUMBERTO-7 SCORE 7/10/2014 2015 2016   Total Score 5 2 -   Total Score - - 4     PHQ-9  English  PHQ-9   Any Language  HUMBERTO-7  Suicide Assessment Five-step Evaluation and Treatment (SAFE-T)    Amount of exercise or physical activity: None    Problems taking medications regularly: Applicable    Medication side effects: none    Diet: regular (no restrictions)          Problem list and histories reviewed & adjusted, as indicated.  Additional history: as documented      Reviewed and updated as needed this visit by clinical staff  Tobacco  Allergies  Meds  Problems  Soc Hx      Reviewed and updated as needed this visit by Provider  Allergies  Meds  Problems         Patient here today for management of worsening anxiety.  She does not note any depression symptoms.  She recently discovered her biological parents.  She reached out to her half-sisters on her mother's side of the family and discovered that her mother has been  for about 3 years.  This has been a positive experience and her mother's family has been interested in getting to know the patient and the patient's family in more detail.    Her experience with her father's side, however, has not been as positive.  She reached out to her father who contacted her back and pretty much told her that he did not want to have anything to do with her.  Patient did find out that her father has a history of diabetes due to his weight but is  "otherwise medically healthy.    Patient has had a tenuous relationship with her 2 daughters.  Her oldest does not speak to her and her youngest to speak to her only when it is convenient to her or if she needs something.    Patient recently has put her house on the market, is renovating a new house, and if her current house sells soon, she will be living in a camper until the new house is renovated.    Patient has a known history of anxiety and depression.  She has been on SSRI medications in the past and is interested in restarting them.  She does not recall any side effects or treatment successes specifically with any medications and is willing to try any SSRI that I recommend.  She most recently was on Lexapro about 3 years ago and does not recall anything specific with that medication.  She is requesting specifically today about some as needed Ativan.    Patient is having hot flashes.  She has been on estradiol in the past and had tried weaning herself off of it.  She has not been successful with a complete wean but is only needing 1 mg daily instead of 2 mg.    ROS:  10 point ROS of systems including Constitutional, Eyes, HENT, Respiratory, Cardiovascular, Gastroenterology, Genitourinary, Integumentary, Muscularskeletal, Psychiatric were all negative except for pertinent positives noted in my HPI.     OBJECTIVE:   /70  Pulse 100  Temp 98.4  F (36.9  C) (Temporal)  Resp 18  Ht 5' 5\" (1.651 m)  Wt 185 lb (83.9 kg)  LMP 04/10/2013  SpO2 97%  Breastfeeding? No  BMI 30.79 kg/m2  Body mass index is 30.79 kg/(m^2).  Physical Exam   Constitutional: She appears well-developed and well-nourished.   Cardiovascular: Normal rate, regular rhythm, S1 normal, S2 normal and normal heart sounds.    No murmur heard.  Pulmonary/Chest: Effort normal and breath sounds normal. No respiratory distress. She has no wheezes. She has no rhonchi. She has no rales.   Neurological: She is alert.   Psychiatric: Her speech is " normal and behavior is normal. Judgment normal. Her mood appears anxious. Her affect is not blunt and not labile. Cognition and memory are normal. She does not exhibit a depressed mood.       ASSESSMENT/PLAN:       ICD-10-CM    1. HUMBERTO (generalized anxiety disorder) F41.1 citalopram (CELEXA) 20 MG tablet     hydrOXYzine (ATARAX) 25 MG tablet     TSH with free T4 reflex     Hemoglobin     DISCONTINUED: citalopram (CELEXA) 20 MG tablet     DISCONTINUED: hydrOXYzine (ATARAX) 25 MG tablet     CANCELED: OB hemoglobin   2. Symptomatic menopausal or female climacteric states N95.1 estradiol (ESTRACE) 1 MG tablet     DISCONTINUED: estradiol (ESTRACE) 1 MG tablet   3. Family history of diabetes mellitus Z83.3 Glucose   4. Lipid screening Z13.220 Lipid panel reflex to direct LDL Fasting     PLAN:  1.  For my assessment today, patient's mental state appears mildly anxious and we will start her on Celexa today.  We discussed use of benzodiazepines in mild anxiety and I recommended that we try hydroxyzine instead.  Patient does not feel that her anxiety was bad enough to necessitate something like clonazepam and I agree with her assessment.  2.  I offered psychotherapy and patient declined today.  I did let her know that at any point, she could ask us for a referral to Samreen Holland, our behavioral health clinician.  3.  Labs ordered as noted above for diagnoses today.  4.  Estradiol ordered at 1 mg daily.  5.  Glucose screening for history of diabetes and body mass index of 30.    Follow up with Provider -1 month for recheck on anxiety.    I spent > 25 minutes of face to face time with the patient, >50% of which was spent counseling and coordination of care regarding anxiety management.     Obinna Callahan MD   [facility]

## 2018-07-16 NOTE — MR AVS SNAPSHOT
After Visit Summary   7/16/2018    Sarika VASQUEZ    MRN: [id number]           Patient Information     Date Of Birth          1970        Visit Information        Provider Department      7/16/2018 8:30 AM Obinna Callahan MD [facility]        Today's Diagnoses     HUMBERTO (generalized anxiety disorder)    -  1    Symptomatic menopausal or female climacteric states        Family history of diabetes mellitus        Lipid screening           Follow-ups after your visit        Your next 10 appointments already scheduled     Jul 19, 2018  7:30 AM CDT   [facility]   [facility] ([facility]    [address] [place] [address] [phone]              Who to contact     If you have questions or need follow up information about today's clinic visit or your schedule please contact [facility] directly at [phone].  Normal or non-critical lab and imaging results will be communicated to you by [employer], letter or phone within 4 business days after the clinic has received the results. If you do not hear from us within 7 days, please contact the clinic through [employer] or phone. If you have a critical or abnormal lab result, we will notify you by phone as soon as possible.  Submit refill requests through [employer] or call your pharmacy and they will forward the refill request to us. Please allow 3 business days for your refill to be completed.          Additional Information About Your Visit        MyChart Information     [employer] gives you secure access to your electronic health record. If you see a primary care provider, you can also send messages to your care team and make appointments. If you have questions, please call your primary care clinic.  If you do not have a primary care provider, please call [phone] and they will assist you.        Care EveryWhere ID     This is your  "Care EveryWhere ID. This could be used by other organizations to access your [place] medical records  [id number]        Your Vitals Were     Pulse Temperature Respirations Height Last Period Pulse Oximetry    100 98.4  F (36.9  C) (Temporal) 18 5' 5\" (1.651 m) 04/10/2013 97%    Breastfeeding? BMI (Body Mass Index)                No 30.79 kg/m2           Blood Pressure from Last 3 Encounters:   07/16/18 122/70   11/13/17 134/72   05/11/17 126/70    Weight from Last 3 Encounters:   07/16/18 185 lb (83.9 kg)   11/13/17 187 lb (84.8 kg)   06/28/17 182 lb (82.6 kg)              We Performed the Following     Glucose     Hemoglobin     Lipid panel reflex to direct LDL Fasting     TSH with free T4 reflex     Vitamin D Deficiency          Today's Medication Changes          These changes are accurate as of 7/16/18  5:10 PM.  If you have any questions, ask your nurse or doctor.               Start taking these medicines.        Dose/Directions    citalopram 20 MG tablet   Commonly known as:  celeXA   Used for:  HUMBERTO (generalized anxiety disorder)   Started by:  Obinna Callahan MD        Dose:  20 mg   Take 1 tablet (20 mg) by mouth daily   Quantity:  30 tablet   Refills:  1       hydrOXYzine 25 MG tablet   Commonly known as:  ATARAX   Used for:  HUMBERTO (generalized anxiety disorder)   Started by:  Obinna Callahan MD        Dose:  25-50 mg   Take 1-2 tablets (25-50 mg) by mouth every 6 hours as needed for anxiety   Quantity:  30 tablet   Refills:  1         These medicines have changed or have updated prescriptions.        Dose/Directions    estradiol 1 MG tablet   Commonly known as:  ESTRACE   This may have changed:    - medication strength  - how much to take  - how to take this  - when to take this  - additional instructions   Used for:  Symptomatic menopausal or female climacteric states   Changed by:  Obinna Callahan MD        Dose:  1 mg   Take 1 tablet (1 mg) by mouth daily   Quantity:  90 tablet "   Refills:  3            Where to get your medicines      These medications were sent to [facility], [place] - [address]   [address] , [place] [address]     Phone:  [phone]     citalopram 20 MG tablet    estradiol 1 MG tablet    hydrOXYzine 25 MG tablet                Primary Care Provider Office Phone # Fax #    Obinna Callahan [phone] [address]  [facility] [address]        Equal Access to Services     [facility] : Hadii aad ku hadasho Soomaali, waaxda luqadaha, qaybta kaalmada adeegyada, waxay idiin hayaan adeeg kharash la'cristo . So [facility] [phone].    ATENCIÓN: Si habla español, tiene a calderón disposición servicios gratuitos de asistencia lingüística. [facility] [phone].    We comply with applicable federal civil rights laws and [place] laws. We do not discriminate on the basis of race, color, national origin, age, disability, sex, sexual orientation, or gender identity.            Thank you!     Thank you for choosing [facility]  for your care. Our goal is always to provide you with excellent care. Hearing back from our patients is one way we can continue to improve our services. Please take a few minutes to complete the written survey that you may receive in the mail after your visit with us. Thank you!             Your Updated Medication List - Protect others around you: Learn how to safely use, store and throw away your medicines at www.disposemymeds.org.          This list is accurate as of 7/16/18  5:10 PM.  Always use your most recent med list.                   Brand Name Dispense Instructions for use Diagnosis    citalopram 20 MG tablet    celeXA    30 tablet    Take 1 tablet (20 mg) by mouth daily    HUMBERTO (generalized anxiety disorder)       estradiol 1 MG tablet    ESTRACE    90 tablet    Take 1 tablet (1 mg) by mouth daily    Symptomatic menopausal or female climacteric states       hydrOXYzine 25 MG  tablet    ATARAX    30 tablet    Take 1-2 tablets (25-50 mg) by mouth every 6 hours as needed for anxiety    HUMBERTO (generalized anxiety disorder)       penciclovir 1 % cream    DENAVIR    1.5 g    Apply topically every 2 hours (while awake)    H/O cold sores       prenatal multivitamin plus iron 27-0.8 MG Tabs per tablet      Take 1 tablet by mouth daily        SUMAtriptan 100 MG tablet    IMITREX    18 tablet    Take 1 tablet (100 mg) by mouth at onset of headache for migraine May repeat in 2 hours. Max 2 tablets/24 hours.    Migraine with aura and without status migrainosus, not intractable       valACYclovir 1000 mg tablet    VALTREX    4 tablet    Take 2 tablets (2,000 mg) every 12 hours for 1 day.    H/O cold sores       VITAMIN D (CHOLECALCIFEROL) PO      Take 2,000 Units by mouth daily

## 2018-07-17 LAB — DEPRECATED CALCIDIOL+CALCIFEROL SERPL-MC: 40 UG/L (ref 20–75)

## 2018-07-17 ASSESSMENT — ANXIETY QUESTIONNAIRES: GAD7 TOTAL SCORE: 17

## 2018-07-17 ASSESSMENT — PATIENT HEALTH QUESTIONNAIRE - PHQ9: SUM OF ALL RESPONSES TO PHQ QUESTIONS 1-9: 10

## 2018-07-19 NOTE — PROGRESS NOTES
Sarika,  Your results of your cholesterol test are elevated, but your cardiovascular risk is not high enough to recommend statin medication for lowering cholesterol.  The rest of your lab results are normal.  Please let me know if you have any questions.    Sincerely,  Dr. Callahan

## 2018-08-13 ENCOUNTER — E-VISIT (OUTPATIENT)
Dept: FAMILY MEDICINE | Facility: CLINIC | Age: 48
End: 2018-08-13

## 2018-08-13 DIAGNOSIS — F41.1 GAD (GENERALIZED ANXIETY DISORDER): ICD-10-CM

## 2018-08-13 PROCEDURE — 99444 ZZC PHYSICIAN ONLINE EVALUATION & MANAGEMENT SERVICE: CPT | Performed by: FAMILY MEDICINE

## 2018-08-13 ASSESSMENT — ANXIETY QUESTIONNAIRES
7. FEELING AFRAID AS IF SOMETHING AWFUL MIGHT HAPPEN: SEVERAL DAYS
4. TROUBLE RELAXING: SEVERAL DAYS
GAD7 TOTAL SCORE: 6
1. FEELING NERVOUS, ANXIOUS, OR ON EDGE: SEVERAL DAYS
5. BEING SO RESTLESS THAT IT IS HARD TO SIT STILL: NOT AT ALL
GAD7 TOTAL SCORE: 6
6. BECOMING EASILY ANNOYED OR IRRITABLE: SEVERAL DAYS
3. WORRYING TOO MUCH ABOUT DIFFERENT THINGS: SEVERAL DAYS
2. NOT BEING ABLE TO STOP OR CONTROL WORRYING: SEVERAL DAYS
7. FEELING AFRAID AS IF SOMETHING AWFUL MIGHT HAPPEN: SEVERAL DAYS

## 2018-08-14 RX ORDER — CITALOPRAM HYDROBROMIDE 20 MG/1
20 TABLET ORAL DAILY
Qty: 30 TABLET | Refills: 1 | Status: SHIPPED | OUTPATIENT
Start: 2018-08-14 | End: 2018-09-10

## 2018-08-14 ASSESSMENT — ANXIETY QUESTIONNAIRES: GAD7 TOTAL SCORE: 6

## 2018-09-10 ENCOUNTER — OFFICE VISIT (OUTPATIENT)
Dept: FAMILY MEDICINE | Facility: CLINIC | Age: 48
End: 2018-09-10
Payer: COMMERCIAL

## 2018-09-10 VITALS
WEIGHT: 184 LBS | BODY MASS INDEX: 30.66 KG/M2 | HEART RATE: 82 BPM | SYSTOLIC BLOOD PRESSURE: 124 MMHG | OXYGEN SATURATION: 100 % | HEIGHT: 65 IN | TEMPERATURE: 98 F | DIASTOLIC BLOOD PRESSURE: 72 MMHG | RESPIRATION RATE: 18 BRPM

## 2018-09-10 DIAGNOSIS — G43.109 MIGRAINE WITH AURA AND WITHOUT STATUS MIGRAINOSUS, NOT INTRACTABLE: ICD-10-CM

## 2018-09-10 DIAGNOSIS — F41.1 GAD (GENERALIZED ANXIETY DISORDER): ICD-10-CM

## 2018-09-10 DIAGNOSIS — Z86.19 H/O COLD SORES: ICD-10-CM

## 2018-09-10 DIAGNOSIS — Z00.01 ENCOUNTER FOR ROUTINE ADULT HEALTH EXAMINATION WITH ABNORMAL FINDINGS: Primary | ICD-10-CM

## 2018-09-10 PROCEDURE — 99396 PREV VISIT EST AGE 40-64: CPT | Performed by: FAMILY MEDICINE

## 2018-09-10 RX ORDER — CITALOPRAM HYDROBROMIDE 20 MG/1
30 TABLET ORAL DAILY
Qty: 45 TABLET | Refills: 1 | Status: SHIPPED | OUTPATIENT
Start: 2018-09-10 | End: 2018-12-04

## 2018-09-10 RX ORDER — HYDROXYZINE HYDROCHLORIDE 25 MG/1
25-50 TABLET, FILM COATED ORAL EVERY 6 HOURS PRN
Qty: 30 TABLET | Refills: 1 | Status: CANCELLED | OUTPATIENT
Start: 2018-09-10

## 2018-09-10 RX ORDER — SUMATRIPTAN 100 MG/1
100 TABLET, FILM COATED ORAL
Qty: 18 TABLET | Refills: 3 | Status: SHIPPED | OUTPATIENT
Start: 2018-09-10

## 2018-09-10 RX ORDER — VALACYCLOVIR HYDROCHLORIDE 1 G/1
TABLET, FILM COATED ORAL
Qty: 4 TABLET | Refills: 3 | Status: SHIPPED | OUTPATIENT
Start: 2018-09-10

## 2018-09-10 ASSESSMENT — ENCOUNTER SYMPTOMS
WEAKNESS: 0
FREQUENCY: 0
SHORTNESS OF BREATH: 0
SINUS PAIN: 0
CHILLS: 0
PARESTHESIAS: 0
NUMBNESS: 0
EYE PAIN: 0
WHEEZING: 0
PHOTOPHOBIA: 0
HEMATURIA: 0
SINUS PRESSURE: 0
MYALGIAS: 0
HEMATOCHEZIA: 0
FEVER: 0
NAUSEA: 0
RHINORRHEA: 0
CHEST TIGHTNESS: 0
COUGH: 0
BREAST MASS: 0
UNEXPECTED WEIGHT CHANGE: 0
DIZZINESS: 0
NERVOUS/ANXIOUS: 1
DIARRHEA: 0
VOMITING: 0
ARTHRALGIAS: 0
HEADACHES: 0
TROUBLE SWALLOWING: 0
ABDOMINAL PAIN: 0
TREMORS: 0
PALPITATIONS: 0
HEARTBURN: 0
FATIGUE: 0
BACK PAIN: 0
POLYDIPSIA: 0
NECK PAIN: 0
SORE THROAT: 0
JOINT SWELLING: 0
DYSURIA: 0
CONSTIPATION: 0

## 2018-09-10 ASSESSMENT — ANXIETY QUESTIONNAIRES
2. NOT BEING ABLE TO STOP OR CONTROL WORRYING: NOT AT ALL
6. BECOMING EASILY ANNOYED OR IRRITABLE: NOT AT ALL
5. BEING SO RESTLESS THAT IT IS HARD TO SIT STILL: NOT AT ALL
7. FEELING AFRAID AS IF SOMETHING AWFUL MIGHT HAPPEN: NOT AT ALL
GAD7 TOTAL SCORE: 0
1. FEELING NERVOUS, ANXIOUS, OR ON EDGE: NOT AT ALL
3. WORRYING TOO MUCH ABOUT DIFFERENT THINGS: NOT AT ALL

## 2018-09-10 ASSESSMENT — PAIN SCALES - GENERAL: PAINLEVEL: NO PAIN (0)

## 2018-09-10 ASSESSMENT — PATIENT HEALTH QUESTIONNAIRE - PHQ9: 5. POOR APPETITE OR OVEREATING: NOT AT ALL

## 2018-09-10 NOTE — PROGRESS NOTES
SUBJECTIVE:   CC: Sarika VASQUEZ is an 48 year old woman who presents for preventive health visit.     Physical   Annual:     Getting at least 3 servings of Calcium per day:  NO    Bi-annual eye exam:  Yes    Dental care twice a year:  NO    Sleep apnea or symptoms of sleep apnea:  Daytime drowsiness    Diet:  Regular (no restrictions)    Frequency of exercise:  None    Taking medications regularly:  Yes    Medication side effects:  None    Additional concerns today:  No    Patient continues to have anxiety symptoms.  She is wondering about increasing her dose to 30 mg daily of citalopram as she continues to have periodic panic episodes.  Hydroxyzine is not effective.    Today's PHQ-2 Score:   PHQ-2 ( 1999 [employer]) 9/10/2018   Q1: Little interest or pleasure in doing things 1   Q2: Feeling down, depressed or hopeless 1   PHQ-2 Score 2   Q1: Little interest or pleasure in doing things Several days   Q2: Feeling down, depressed or hopeless Several days   PHQ-2 Score 2       Abuse: Current or Past(Physical, Sexual or Emotional)- No  Do you feel safe in your environment - Yes    Social History   Substance Use Topics     Smoking status: Former Smoker     Packs/day: 1.00     Years: 10.00     Types: Cigarettes     Quit date: 3/26/2013     Smokeless tobacco: Never Used      Comment: 1ppd     Alcohol use Yes      Comment: Once/week     Alcohol Use 9/10/2018   If you drink alcohol do you typically have greater than 3 drinks per day OR greater than 7 drinks per week? No   No flowsheet data found.    Reviewed orders with patient.  Reviewed health maintenance and updated orders accordingly - Yes  Labs reviewed in EPIC    Patient under age 50, mutual decision reflected in health maintenance.      Pertinent mammograms are reviewed under the imaging tab.  History of abnormal Pap smear: Status post benign hysterectomy. Health Maintenance and Surgical History updated.  PAP / HPV 7/19/2011 8/25/2008 5/21/2007   PAP NIL NIL NIL  "    Reviewed and updated as needed this visit by clinical staff  Tobacco  Allergies  Meds  Problems  Med Hx  Surg Hx  Fam Hx  Soc Hx          Reviewed and updated as needed this visit by Provider  Tobacco  Allergies  Meds  Problems  Med Hx  Surg Hx  Fam Hx  Soc Hx             Review of Systems   Constitutional: Negative for chills, fatigue, fever and unexpected weight change.   HENT: Negative for congestion, ear pain, hearing loss, mouth sores, postnasal drip, rhinorrhea, sinus pain, sinus pressure, sore throat and trouble swallowing.    Eyes: Negative for photophobia, pain and visual disturbance.   Respiratory: Negative for cough, chest tightness, shortness of breath and wheezing.    Cardiovascular: Negative for chest pain, palpitations and peripheral edema.   Gastrointestinal: Negative for abdominal pain, constipation, diarrhea, heartburn, hematochezia, nausea and vomiting.   Endocrine: Negative for cold intolerance, heat intolerance, polydipsia and polyuria.   Breasts:  Negative for tenderness, breast mass and discharge.   Genitourinary: Negative for dysuria, frequency, genital sores, hematuria, pelvic pain, urgency, vaginal bleeding, vaginal discharge and vaginal pain.   Musculoskeletal: Negative for arthralgias, back pain, joint swelling, myalgias and neck pain.   Skin: Negative for rash.   Allergic/Immunologic: Negative for environmental allergies.   Neurological: Negative for dizziness, tremors, syncope, weakness, numbness, headaches and paresthesias.   Psychiatric/Behavioral: Negative for mood changes. The patient is nervous/anxious (Anxiety being managed with citalopram as noted above).         OBJECTIVE:   /72  Pulse 82  Temp 98  F (36.7  C) (Temporal)  Resp 18  Ht 5' 5\" (1.651 m)  Wt 184 lb (83.5 kg)  LMP 04/10/2013  SpO2 100%  Breastfeeding? No  BMI 30.62 kg/m2  Physical Exam   Constitutional: She is oriented to person, place, and time. Vital signs are normal. She appears " well-developed and well-nourished. No distress.   HENT:   Right Ear: Hearing, tympanic membrane, external ear and ear canal normal.   Left Ear: Hearing, tympanic membrane, external ear and ear canal normal.   Nose: Nose normal.   Mouth/Throat: Uvula is midline, oropharynx is clear and moist and mucous membranes are normal. No oropharyngeal exudate or posterior oropharyngeal erythema.   Eyes: Conjunctivae, EOM and lids are normal. Pupils are equal, round, and reactive to light. Right eye exhibits no discharge. Left eye exhibits no discharge.   Neck: Normal range of motion. Neck supple. No tracheal deviation present. No thyromegaly present.   Cardiovascular: Normal rate, regular rhythm, S1 normal, S2 normal, normal heart sounds and normal pulses.  Exam reveals no S3, no S4 and no friction rub.    No murmur heard.  Pulmonary/Chest: Effort normal and breath sounds normal. No respiratory distress. She has no wheezes. She has no rales.   Abdominal: Soft. Normal appearance and bowel sounds are normal. She exhibits no mass. There is no hepatosplenomegaly. There is no tenderness.   Musculoskeletal: Normal range of motion. She exhibits no edema.   Lymphadenopathy:     She has no cervical adenopathy.        Right: No supraclavicular adenopathy present.        Left: No supraclavicular adenopathy present.   Neurological: She is alert and oriented to person, place, and time. She has normal strength and normal reflexes. No cranial nerve deficit or sensory deficit. She exhibits normal muscle tone.   Skin: Skin is warm, dry and intact. No rash noted.   Psychiatric: She has a normal mood and affect. Judgment and thought content normal. Cognition and memory are normal.       ASSESSMENT/PLAN:       ICD-10-CM    1. Encounter for routine adult health examination with abnormal findings Z00.01    2. HUMBERTO (generalized anxiety disorder) F41.1 citalopram (CELEXA) 20 MG tablet   3. Migraine with aura and without status migrainosus, not  "intractable G43.109 SUMAtriptan (IMITREX) 100 MG tablet   4. H/O cold sores Z86.19 valACYclovir (VALTREX) 1000 mg tablet     I increase patient's citalopram to 30 mg daily.  She will follow-up in 1 month by either office visit, telephone visit, or MyChart e-visit.    COUNSELING:  Reviewed preventive health counseling, as reflected in patient instructions       Regular exercise       Healthy diet/nutrition       Vision screening    BP Readings from Last 1 Encounters:   09/10/18 124/72     Estimated body mass index is 30.62 kg/(m^2) as calculated from the following:    Height as of this encounter: 5' 5\" (1.651 m).    Weight as of this encounter: 184 lb (83.5 kg).    BP Screening:   Last 3 BP Readings:    BP Readings from Last 3 Encounters:   09/10/18 124/72   07/16/18 122/70   11/13/17 134/72       The following was recommended to the patient:  Re-screen BP within a year and recommended lifestyle modifications  Weight management plan: Discussed healthy diet and exercise guidelines and patient will follow up in 12 months in clinic to re-evaluate.     reports that she quit smoking about 5 years ago. Her smoking use included Cigarettes. She has a 10.00 pack-year smoking history. She has never used smokeless tobacco.      Counseling Resources:  ATP IV Guidelines  Pooled Cohorts Equation Calculator  Breast Cancer Risk Calculator  FRAX Risk Assessment  ICSI Preventive Guidelines  Dietary Guidelines for Americans, 2010  USDA's MyPlate  ASA Prophylaxis  Lung CA Screening    Obinna Callahan MD  [facility]  Answers for HPI/ROS submitted by the patient on 9/10/2018   PHQ-2 Score: 2    "

## 2018-09-10 NOTE — PATIENT INSTRUCTIONS
1.  Saline sinus rinse (one form comes in a squirt bottle form while another kind is known as a Neti Pots).  Follow directions on package.  May do this 1-2 times per day.  2.  Flonase:  A good idea is to use this medication with saline nasal irrigation.  If you choose to do this, use the Flonase about 30-45 minutes after the sinus rinse to maximize effect of medication.    3.  Sudafed (psudoephedrine) per package directions.  This is the medication that has to be obtained from behind the pharmacist's counter (short term use only)  4.  Antihistamines:  Daytime:  Zyrtec (cetirizine).  Nighttime:  Benadryl (diphenhydramine).  5.  Tylenol (acetaminophen) or Advil (ibuprofen) as needed for pain and/or fever.

## 2018-09-10 NOTE — MR AVS SNAPSHOT
After Visit Summary   9/10/2018    Sarika VASQUEZ    MRN: [id number]           Patient Information     Date Of Birth          1970        Visit Information        Provider Department      9/10/2018 10:45 AM Obinna Callahan MD [facility]        Today's Diagnoses     Encounter for routine adult health examination with abnormal findings    -  1    HUMBERTO (generalized anxiety disorder)        Migraine with aura and without status migrainosus, not intractable        H/O cold sores          Care Instructions    1.  Saline sinus rinse (one form comes in a squirt bottle form while another kind is known as a Neti Pots).  Follow directions on package.  May do this 1-2 times per day.  2.  Flonase:  A good idea is to use this medication with saline nasal irrigation.  If you choose to do this, use the Flonase about 30-45 minutes after the sinus rinse to maximize effect of medication.    3.  Sudafed (psudoephedrine) per package directions.  This is the medication that has to be obtained from behind the pharmacist's counter (short term use only)  4.  Antihistamines:  Daytime:  Zyrtec (cetirizine).  Nighttime:  Benadryl (diphenhydramine).  5.  Tylenol (acetaminophen) or Advil (ibuprofen) as needed for pain and/or fever.           Follow-ups after your visit        Follow-up notes from your care team     Return in about 1 month (around 10/10/2018) for anxiety medication recheck.      Who to contact     If you have questions or need follow up information about today's clinic visit or your schedule please contact [facility] directly at [phone].  Normal or non-critical lab and imaging results will be communicated to you by MyChart, letter or phone within 4 business days after the clinic has received the results. If you do not hear from us within 7 days, please contact the clinic through MyChart or phone. If you have a critical or abnormal lab result, we will notify you by  "phone as soon as possible.  Submit refill requests through [email or web address] or call your pharmacy and they will forward the refill request to us. Please allow 3 business days for your refill to be completed.          Additional Information About Your Visit        [email or web address] Information     [email or web address] gives you secure access to your electronic health record. If you see a primary care provider, you can also send messages to your care team and make appointments. If you have questions, please call your primary care clinic.  If you do not have a primary care provider, please call [phone] and they will assist you.        Care EveryWhere ID     This is your Care EveryWhere ID. This could be used by other organizations to access your [facility] medical records  [id number]        Your Vitals Were     Pulse Temperature Respirations Height Last Period Pulse Oximetry    82 98  F (36.7  C) (Temporal) 18 5' 5\" (1.651 m) 04/10/2013 100%    Breastfeeding? BMI (Body Mass Index)                No 30.62 kg/m2           Blood Pressure from Last 3 Encounters:   09/10/18 124/72   07/16/18 122/70   11/13/17 134/72    Weight from Last 3 Encounters:   09/10/18 184 lb (83.5 kg)   07/16/18 185 lb (83.9 kg)   11/13/17 187 lb (84.8 kg)              Today, you had the following     No orders found for display         Today's Medication Changes          These changes are accurate as of 9/10/18  4:11 PM.  If you have any questions, ask your nurse or doctor.               These medicines have changed or have updated prescriptions.        Dose/Directions    citalopram 20 MG tablet   Commonly known as:  celeXA   This may have changed:  how much to take   Used for:  HUMBERTO (generalized anxiety disorder)   Changed by:  Obinna Callahan MD        Dose:  30 mg   Take 1.5 tablets (30 mg) by mouth daily   Quantity:  45 tablet   Refills:  1         Stop taking these medicines if you haven't already. Please contact your care team if you have questions.     " hydrOXYzine 25 MG tablet   Commonly known as:  ATARAX   Stopped by:  Obinna Callahan MD                Where to get your medicines      These medications were sent to [facility] - [address] [address]     Phone:  [phone]     citalopram 20 MG tablet    SUMAtriptan 100 MG tablet    valACYclovir 1000 mg tablet                Primary Care Provider Office Phone # Fax #    Obinna Callahan [phone]       [address] [place] [address]        Equal Access to Services     [facility]: Hadii aad ku hadasho Soomaali, waaxda luqadaha, qaybta kaalmada adeegyada, waxay idiin hayaan adegab sauer . So [facility] [phone].    ATENCIÓN: Si habla español, tiene a calderón disposición servicios gratuitos de asistencia lingüística. [facility] [phone].    We comply with applicable federal civil rights laws and [place] laws. We do not discriminate on the basis of race, color, national origin, age, disability, sex, sexual orientation, or gender identity.            Thank you!     Thank you for choosing [facility]  for your care. Our goal is always to provide you with excellent care. Hearing back from our patients is one way we can continue to improve our services. Please take a few minutes to complete the written survey that you may receive in the mail after your visit with us. Thank you!             Your Updated Medication List - Protect others around you: Learn how to safely use, store and throw away your medicines at www.disposemymeds.org.          This list is accurate as of 9/10/18  4:11 PM.  Always use your most recent med list.                   Brand Name Dispense Instructions for use Diagnosis    citalopram 20 MG tablet    celeXA    45 tablet    Take 1.5 tablets (30 mg) by mouth daily    HUMBERTO (generalized anxiety disorder)       estradiol 1 MG tablet    ESTRACE    90 tablet    Take 1  tablet (1 mg) by mouth daily    Symptomatic menopausal or female climacteric states       penciclovir 1 % cream    DENAVIR    1.5 g    Apply topically every 2 hours (while awake)    H/O cold sores       prenatal multivitamin plus iron 27-0.8 MG Tabs per tablet      Take 1 tablet by mouth daily        SUMAtriptan 100 MG tablet    IMITREX    18 tablet    Take 1 tablet (100 mg) by mouth at onset of headache for migraine May repeat in 2 hours. Max 2 tablets/24 hours.    Migraine with aura and without status migrainosus, not intractable       valACYclovir 1000 mg tablet    VALTREX    4 tablet    Take 2 tablets (2,000 mg) every 12 hours for 1 day.    H/O cold sores       VITAMIN D (CHOLECALCIFEROL) PO      Take 2,000 Units by mouth daily

## 2018-09-11 ASSESSMENT — ANXIETY QUESTIONNAIRES: GAD7 TOTAL SCORE: 0

## 2018-09-11 ASSESSMENT — PATIENT HEALTH QUESTIONNAIRE - PHQ9: SUM OF ALL RESPONSES TO PHQ QUESTIONS 1-9: 2

## 2018-10-01 ENCOUNTER — HOSPITAL ENCOUNTER (EMERGENCY)
Facility: CLINIC | Age: 48
Discharge: HOME | End: 2018-10-01
Attending: FAMILY MEDICINE | Admitting: FAMILY MEDICINE
Payer: COMMERCIAL

## 2018-10-01 ENCOUNTER — TRANSFERRED RECORDS (OUTPATIENT)
Dept: HEALTH INFORMATION MANAGEMENT | Facility: CLINIC | Age: 48
End: 2018-10-01

## 2018-10-01 ENCOUNTER — APPOINTMENT (OUTPATIENT)
Dept: GENERAL RADIOLOGY | Facility: CLINIC | Age: 48
End: 2018-10-01
Attending: FAMILY MEDICINE
Payer: COMMERCIAL

## 2018-10-01 VITALS
DIASTOLIC BLOOD PRESSURE: 99 MMHG | HEART RATE: 87 BPM | RESPIRATION RATE: 12 BRPM | WEIGHT: 185 LBS | OXYGEN SATURATION: 100 % | SYSTOLIC BLOOD PRESSURE: 150 MMHG | TEMPERATURE: 97.7 F | HEIGHT: 63 IN | BODY MASS INDEX: 32.78 KG/M2

## 2018-10-01 DIAGNOSIS — T88.7XXA MEDICATION SIDE EFFECTS: ICD-10-CM

## 2018-10-01 DIAGNOSIS — R07.89 ATYPICAL CHEST PAIN: ICD-10-CM

## 2018-10-01 LAB
ALBUMIN SERPL-MCNC: 3.9 G/DL (ref 3.4–5)
ALP SERPL-CCNC: 48 U/L (ref 40–150)
ALT SERPL W P-5'-P-CCNC: 22 U/L (ref 0–50)
ANION GAP SERPL CALCULATED.3IONS-SCNC: 8 MMOL/L (ref 3–14)
AST SERPL W P-5'-P-CCNC: 15 U/L (ref 0–45)
BASOPHILS # BLD AUTO: 0 10E9/L (ref 0–0.2)
BASOPHILS NFR BLD AUTO: 0.7 %
BILIRUB SERPL-MCNC: 0.3 MG/DL (ref 0.2–1.3)
BUN SERPL-MCNC: 12 MG/DL (ref 7–30)
CALCIUM SERPL-MCNC: 8.5 MG/DL (ref 8.5–10.1)
CHLORIDE SERPL-SCNC: 106 MMOL/L (ref 94–109)
CO2 SERPL-SCNC: 27 MMOL/L (ref 20–32)
CREAT SERPL-MCNC: 0.57 MG/DL (ref 0.52–1.04)
DIFFERENTIAL METHOD BLD: NORMAL
EOSINOPHIL NFR BLD AUTO: 3.3 %
ERYTHROCYTE [DISTWIDTH] IN BLOOD BY AUTOMATED COUNT: 12.6 % (ref 10–15)
GFR SERPL CREATININE-BSD FRML MDRD: >90 ML/MIN/1.7M2
GLUCOSE SERPL-MCNC: 94 MG/DL (ref 70–99)
HCT VFR BLD AUTO: 38.4 % (ref 35–47)
HGB BLD-MCNC: 12.6 G/DL (ref 11.7–15.7)
IMM GRANULOCYTES # BLD: 0 10E9/L (ref 0–0.4)
IMM GRANULOCYTES NFR BLD: 0.2 %
LYMPHOCYTES # BLD AUTO: 3.1 10E9/L (ref 0.8–5.3)
LYMPHOCYTES NFR BLD AUTO: 51.5 %
MCH RBC QN AUTO: 30.8 PG (ref 26.5–33)
MCHC RBC AUTO-ENTMCNC: 32.8 G/DL (ref 31.5–36.5)
MCV RBC AUTO: 94 FL (ref 78–100)
MONOCYTES # BLD AUTO: 0.4 10E9/L (ref 0–1.3)
MONOCYTES NFR BLD AUTO: 7.4 %
NEUTROPHILS # BLD AUTO: 2.2 10E9/L (ref 1.6–8.3)
NEUTROPHILS NFR BLD AUTO: 36.9 %
NRBC # BLD AUTO: 0 10*3/UL
NRBC BLD AUTO-RTO: 0 /100
PLATELET # BLD AUTO: 350 10E9/L (ref 150–450)
POTASSIUM SERPL-SCNC: 4.1 MMOL/L (ref 3.4–5.3)
PROT SERPL-MCNC: 7.5 G/DL (ref 6.8–8.8)
RBC # BLD AUTO: 4.09 10E12/L (ref 3.8–5.2)
SODIUM SERPL-SCNC: 141 MMOL/L (ref 133–144)
TROPONIN I SERPL-MCNC: <0.015 UG/L (ref 0–0.04)
WBC # BLD AUTO: 6 10E9/L (ref 4–11)

## 2018-10-01 PROCEDURE — 80053 COMPREHEN METABOLIC PANEL: CPT | Performed by: FAMILY MEDICINE

## 2018-10-01 PROCEDURE — 93005 ELECTROCARDIOGRAM TRACING: CPT | Performed by: FAMILY MEDICINE

## 2018-10-01 PROCEDURE — 71046 X-RAY EXAM CHEST 2 VIEWS: CPT | Mod: TC

## 2018-10-01 PROCEDURE — 85025 COMPLETE CBC W/AUTO DIFF WBC: CPT | Performed by: FAMILY MEDICINE

## 2018-10-01 PROCEDURE — 84484 ASSAY OF TROPONIN QUANT: CPT | Performed by: FAMILY MEDICINE

## 2018-10-01 PROCEDURE — 93010 ELECTROCARDIOGRAM REPORT: CPT | Mod: Z6 | Performed by: FAMILY MEDICINE

## 2018-10-01 PROCEDURE — 99285 EMERGENCY DEPT VISIT HI MDM: CPT | Mod: 25 | Performed by: FAMILY MEDICINE

## 2018-10-01 NOTE — DISCHARGE INSTRUCTIONS
Thank you for giving us the opportunity to see you.  Your EKG, chest x-ray and troponin enzyme were reassuring.    I suspect that your symptoms were side effects from the Imitrex.    Please follow-up with your primary care provider within the next 3-5 days, and discuss scheduling an outpatient cardiac stress test.  Please have your blood pressure checked more frequently over the next 1-2 weeks.  Review these blood pressure readings with your doctor.    After discharge, please closely monitor for any new or worsening symptoms. Return to the Emergency Department at any time if your symptoms worsen.

## 2018-10-01 NOTE — ED AVS SNAPSHOT
[facility] Emergency Department    [address] [place] [address]    Phone:  [phone]    Fax:  [phone]                                       Sarika VASQUEZ   MRN: [id number]    Department:  [facility] Emergency Department   Date of Visit:  10/1/2018           Patient Information     Date Of Birth          1970        Your diagnoses for this visit were:     Atypical chest pain     Medication side effects (imitrex)        You were seen by Asad Camejo MD.      Follow-up Information     Follow up with Obinna Callahan MD In 4 days.    Specialty:  Family Practice    Why:  Schedule outpatient stress test; review your blood pressure readings at home    Contact information:    [address] [place] [address] [phone]          Follow up with [facility] Emergency Department.    Specialty:  EMERGENCY MEDICINE    Why:  If symptoms worsen    Contact information:    [address] [place] [address] [phone]    Additional information:    From [address]: Exit at [address] on [address] side of [place]. Turn right on [address]. Turn left at stoplight on [address]. [facility] will be in view two blocks ahead        Discharge Instructions       Thank you for giving us the opportunity to see you.  Your EKG, chest x-ray and troponin enzyme were reassuring.    I suspect that your symptoms were side effects from the Imitrex.    Please follow-up with your primary care provider within the next 3-5 days, and discuss scheduling an outpatient cardiac stress test.  Please have your blood pressure checked more frequently over the next 1-2 weeks.  Review these blood pressure readings with your doctor.    After discharge, please closely monitor for any new or worsening symptoms. Return to the Emergency Department at any time if your symptoms worsen.        24 Hour Appointment Hotline       To make an appointment at any [facility]  clinic, call [phone] ([phone]). If you don't have a family doctor or clinic, we will help you find one. [facility] clinics are conveniently located to serve the needs of you and your family.             Review of your medicines      Our records show that you are taking the medicines listed below. If these are incorrect, please call your family doctor or clinic.        Dose / Directions Last dose taken    citalopram 20 MG tablet   Commonly known as:  celeXA   Dose:  30 mg   Quantity:  45 tablet        Take 1.5 tablets (30 mg) by mouth daily   Refills:  1        estradiol 1 MG tablet   Commonly known as:  ESTRACE   Dose:  1 mg   Quantity:  90 tablet        Take 1 tablet (1 mg) by mouth daily   Refills:  3        penciclovir 1 % cream   Commonly known as:  DENAVIR   Quantity:  1.5 g        Apply topically every 2 hours (while awake)   Refills:  3        prenatal multivitamin plus iron 27-0.8 MG Tabs per tablet   Dose:  1 tablet        Take 1 tablet by mouth daily   Refills:  0        SUMAtriptan 100 MG tablet   Commonly known as:  IMITREX   Dose:  100 mg   Quantity:  18 tablet        Take 1 tablet (100 mg) by mouth at onset of headache for migraine May repeat in 2 hours. Max 2 tablets/24 hours.   Refills:  3        valACYclovir 1000 mg tablet   Commonly known as:  VALTREX   Quantity:  4 tablet        Take 2 tablets (2,000 mg) every 12 hours for 1 day.   Refills:  3        VITAMIN D (CHOLECALCIFEROL) PO   Dose:  2000 Units        Take 2,000 Units by mouth daily   Refills:  0                Procedures and tests performed during your visit     CBC with platelets differential    Comprehensive metabolic panel    EKG 12-lead, tracing only    Peripheral IV catheter    Troponin I    XR Chest 2 Views      Orders Needing Specimen Collection     None      Pending Results     No orders found from 9/29/2018 to 10/2/2018.            Pending Culture Results     No orders found from 9/29/2018 to 10/2/2018.             Pending Results Instructions     If you had any lab results that were not finalized at the time of your Discharge, you can call the ED Lab Result RN at [phone]. You will be contacted by this team for any positive Lab results or changes in treatment. The nurses are available 7 days a week from 10A to 6:30P.  You can leave a message 24 hours per day and they will return your call.        Thank you for choosing [facility]       Thank you for choosing [facility] for your care. Our goal is always to provide you with excellent care. Hearing back from our patients is one way we can continue to improve our services. Please take a few minutes to complete the written survey that you may receive in the mail after you visit with us. Thank you!        [employer] Information     [employer] gives you secure access to your electronic health record. If you see a primary care provider, you can also send messages to your care team and make appointments. If you have questions, please call your primary care clinic.  If you do not have a primary care provider, please call [phone] and they will assist you.        Care EveryWhere ID     This is your Care EveryWhere ID. This could be used by other organizations to access your [facility] medical records  [id number]        Equal Access to Services     ALEXA ARMENTA AH: Hadii ricki Espitia, vandana jackson, marin anderson, merritt vega. So [facility] [phone].    ATENCIÓN: Si habla español, tiene a calderón disposición servicios gratuitos de asistencia lingüística. Jessica al [phone].    We comply with applicable federal civil rights laws and [place] laws. We do not discriminate on the basis of race, color, national origin, age, disability, sex, sexual orientation, or gender identity.            After Visit Summary       This is your record. Keep this with you and show to your community pharmacist(s) and doctor(s) at your next visit.

## 2018-10-01 NOTE — ED TRIAGE NOTES
Around 1100 she had some sparkling in her R visual field and took a 100 mg imitrex and shortly after developed pain in her back and high blood pressure.

## 2018-10-01 NOTE — ED PROVIDER NOTES
"                                                            [facility] ED Provider Note   CC:     Chief Complaint   Patient presents with     Chest Pain     HPI:  Sarika VASQUEZ is a 48 year old female who presented to the emergency department with acute onset of right-sided aura with kaleidoscope perspective to the right visual field that started around 1130 this morning.  She was at work at the [employer] when she initially developed the visual auras, followed by a slight headache.  She took an Imitrex 100 mg tablet at around 11:45-12:00 noon.  A short time afterwards, she started to feel \"off\" and started to have an odd feeling in her chest.  She describes a pressure sensation in the central part of her chest coming up into the throat.  She now has a sore throat.  The chest symptoms lasted for about 30 minutes.  She had a blood pressure checked and noted that it was high in the range of 160/100.  She reports that she has a history of migraines but without aura, and these occur depending on whether her neck and back are aligned.  She had an adjustment last week, knowing that she was going for [place] this weekend.  She has also been going to a little more stress over the weekend, with her boyfriend's daughter and trying to spend time with her newly found half-sister.  Patient has a distant history of V. tach in 2014, with plans for subsequent ablation.  Her symptoms did not recur and she has not required any treatment since then.  Part of her workup including a Holter monitor, echocardiogram, and an MR scan of her heart.  She has not had any recent exertional chest pain or shortness of breath, calf pain, ankle swelling, palpitations, etc.  Patient was seen by a physician's assistant at the clinic, had a normal EKG, and was told to follow-up for further testing today.  She did not want to go to [place] [place], and drove an hour and a half to our emergency department.  She denies " any tobacco use.  She had a couple of drinks over the weekend which is not unusual.    Problem List:  Patient Active Problem List    Diagnosis     Positive NATACHA (antinuclear antibody)     HUMBERTO (generalized anxiety disorder)     Family history of diabetes mellitus     Migraine with aura and without status migrainosus, not intractable     H/O cold sores     Obesity (BMI 30-39.9)     Symptomatic menopausal or female climacteric states     Pain in joint, multiple sites     Pulmonary nodule     Vitamin D deficiency     Hyperlipidemia LDL goal <160     Major depressive disorder with single episode, in full remission (H)     Tension headache       MEDS:   Discharge Medication List as of 10/1/2018  4:04 PM      CONTINUE these medications which have NOT CHANGED    Details   citalopram (CELEXA) 20 MG tablet Take 1.5 tablets (30 mg) by mouth daily, Disp-45 tablet, R-1, E-Prescribe      estradiol (ESTRACE) 1 MG tablet Take 1 tablet (1 mg) by mouth daily, Disp-90 tablet, R-3, E-Prescribe      penciclovir (DENAVIR) 1 % cream Apply topically every 2 hours (while awake)Disp-1.5 g, [id number]      Prenatal Vit-Fe Fumarate-FA (PRENATAL MULTIVITAMIN  PLUS IRON) 27-0.8 MG TABS Take 1 tablet by mouth daily, Historical      SUMAtriptan (IMITREX) 100 MG tablet Take 1 tablet (100 mg) by mouth at onset of headache for migraine May repeat in 2 hours. Max 2 tablets/24 hours., Disp-18 tablet, R-3, E-Prescribeprofile      valACYclovir (VALTREX) 1000 mg tablet Take 2 tablets (2,000 mg) every 12 hours for 1 day., Disp-4 tablet, R-3, E-Prescribeprofile      VITAMIN D, CHOLECALCIFEROL, PO Take 2,000 Units by mouth daily, Historical             ALLERGIES:    Allergies   Allergen Reactions     No Known Drug Allergies        Past Surgical History:   Procedure Laterality Date     CYSTOSCOPY  5/6/2013    Procedure: CYSTOSCOPY;;  Surgeon: Bhaskar Dougherty MD;  Location:  OR     CYSTOSCOPY N/A 10/2/2014    Procedure: CYSTOSCOPY;  Surgeon:  "Gomez Jules MD;  Location: [facility] OR     GYN SURGERY      hysterectomy, total     HC REDUCTION OF LARGE BREAST  2001     HYSTERECTOMY, PAP NO LONGER INDICATED       LAPAROSCOPIC ASSISTED HYSTERECTOMY VAGINAL, BILATERAL SALPINGO-OOPHORECTOMY, COMBINED  5/6/2013    Procedure: COMBINED LAPAROSCOPIC ASSISTED HYSTERECTOMY VAGINAL, SALPINGO-OOPHORECTOMY;  combined laparoscopic assisted hysterectomy vagainal , salpingo oophorectomy with cystoscopy;  Surgeon: Bhaskar Dougherty MD;  Location: [facility] OR     SLING TRANSVAGINAL N/A 10/2/2014    Procedure: SLING TRANSVAGINAL;  Surgeon: Gomez Jules MD;  Location: [facility] OR       Social History   Substance Use Topics     Smoking status: Former Smoker     Packs/day: 1.00     Years: 10.00     Types: Cigarettes     Quit date: 3/26/2013     Smokeless tobacco: Never Used      Comment: 1ppd     Alcohol use Yes      Comment: Once/week         Review of Systems   Except as noted in HPI, all other systems were reviewed and are negative    Physical Exam     Vitals were reviewed  Patient Vitals for the past 8 hrs:   BP Temp Temp src Pulse Heart Rate Resp SpO2 Height Weight   10/01/18 1610 (!) 150/99 - - 87 - - - - -   10/01/18 1430 (!) 167/105 - - - 101 12 100 % - -   10/01/18 1423 (!) 167/100 97.7  F (36.5  C) Oral 101 - 18 98 % 1.6 m (5' 3\") 83.9 kg (185 lb)     GENERAL APPEARANCE: Alert, oriented, no distress  FACE: normal facies  EYES: Pupils are equal and reactive to light; patient reports increased light sensitivity in the right eye; extraocular muscles are intact  HENT: normal external exam  NECK: no adenopathy or asymmetry  RESP: normal respiratory effort; clear breath sounds bilaterally  CV: regular rate and rhythm; no significant murmurs, gallops or rubs  ABD: soft, no tenderness; no rebound or guarding; bowel sounds are normal  MS: no gross deformities noted; normal muscle tone.  EXT: No calf tenderness or pitting edema  SKIN: no worrisome rash  NEURO: no facial " droop; no focal deficits, speech is normal  PSYCH: normal mood and affect      Available Lab/Imaging Results     Results for orders placed or performed during the hospital encounter of 10/01/18 (from the past 24 hour(s))   CBC with platelets differential   Result Value Ref Range    WBC 6.0 4.0 - 11.0 10e9/L    RBC Count 4.09 3.8 - 5.2 10e12/L    Hemoglobin 12.6 11.7 - 15.7 g/dL    Hematocrit 38.4 35.0 - 47.0 %    MCV 94 78 - 100 fl    MCH 30.8 26.5 - 33.0 pg    MCHC 32.8 31.5 - 36.5 g/dL    RDW 12.6 10.0 - 15.0 %    Platelet Count 350 150 - 450 10e9/L    Diff Method Automated Method     % Neutrophils 36.9 %    % Lymphocytes 51.5 %    % Monocytes 7.4 %    % Eosinophils 3.3 %    % Basophils 0.7 %    % Immature Granulocytes 0.2 %    Nucleated RBCs 0 0 /100    Absolute Neutrophil 2.2 1.6 - 8.3 10e9/L    Absolute Lymphocytes 3.1 0.8 - 5.3 10e9/L    Absolute Monocytes 0.4 0.0 - 1.3 10e9/L    Absolute Basophils 0.0 0.0 - 0.2 10e9/L    Abs Immature Granulocytes 0.0 0 - 0.4 10e9/L    Absolute Nucleated RBC 0.0    Comprehensive metabolic panel   Result Value Ref Range    Sodium 141 133 - 144 mmol/L    Potassium 4.1 3.4 - 5.3 mmol/L    Chloride 106 94 - 109 mmol/L    Carbon Dioxide 27 20 - 32 mmol/L    Anion Gap 8 3 - 14 mmol/L    Glucose 94 70 - 99 mg/dL    Urea Nitrogen 12 7 - 30 mg/dL    Creatinine 0.57 0.52 - 1.04 mg/dL    GFR Estimate >90 >60 mL/min/1.7m2    GFR Estimate If Black >90 >60 mL/min/1.7m2    Calcium 8.5 8.5 - 10.1 mg/dL    Bilirubin Total 0.3 0.2 - 1.3 mg/dL    Albumin 3.9 3.4 - 5.0 g/dL    Protein Total 7.5 6.8 - 8.8 g/dL    Alkaline Phosphatase 48 40 - 150 U/L    ALT 22 0 - 50 U/L    AST 15 0 - 45 U/L   Troponin I   Result Value Ref Range    Troponin I ES <0.015 0.000 - 0.045 ug/L   XR Chest 2 Views    Narrative    XR CHEST 2 VW   10/1/2018 3:22 PM     HISTORY: chest pressure;     COMPARISON: CT chest dated 2/18/2014.    FINDINGS:  The lungs are clear. No pleural effusions or pneumothorax.  Heart size and  pulmonary vascularity are within normal limits. No  acute fracture.      Impression    IMPRESSION: No evidence of acute cardiopulmonary disease is seen.    EMELY CALLOWAY MD       EKG reviewed by me: Normal sinus rhythm with heart rate of 94.  Normal [place], QT and QRS intervals.  Normal axis.  Impression: Normal ECG.  No acute ST-T wave changes.           Impression     Final diagnoses:   Atypical chest pain   Medication side effects (imitrex)         ED Course & Medical Decision Making   Sarika VASQUEZ is a 48 year old female who presented to the emergency department with acute onset of right-sided aura, followed by mild headache, and she took 100 mg tablet of Imitrex at around 12 noon.  Short time afterward she developed chest discomfort, some numbness and tingling, sensation of some pressure and soreness in her throat.  Symptoms lasted about 30 minutes.  During this time she did not feel right, and her blood pressure was elevated.  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  Patient drove an hour and a half to come to our emergency department, and at the time she was seen did not have any further symptoms.  Her EKG was reassuring.  Her workup reveals a normal chest x-ray, troponin of less than 0.015, normal comprehensive metabolic panel and CBC.  The patient's symptoms are most consistent with side effect of her Imitrex.  I did recommend that she follow-up with her primary care provider in the next several days to a week to discuss scheduling an outpatient cardiac stress test.  She is taking Imitrex in the past without any side effects.  I would like to make sure she is not developing some early ischemia in which the Imitrex is instigating mild ischemia.  Patient was also informed to monitor her blood pressures more closely since her blood pressures were running higher than normal.  Return to the ED at any time if  symptoms worsen.               Written after-visit summary and instructions were given at the time of discharge.      Discharge Medication List as of 10/1/2018  4:04 PM               This note was dictated using electronic voice recognition software and although reviewed, may contain grammatical and spelling errors.        Asad Camejo MD  10/01/18 1945

## 2018-10-01 NOTE — ED AVS SNAPSHOT
[facility] Emergency Department    [address] [place] [address]    Phone:  [phone]    Fax:  [phone]                                       Sarika VASQUEZ   MRN: [id number]    Department:  [facility] Emergency Department   Date of Visit:  10/1/2018           After Visit Summary Signature Page     I have received my discharge instructions, and my questions have been answered. I have discussed any challenges I see with this plan with the nurse or doctor.    ..........................................................................................................................................  Patient/Patient Representative Signature      ..........................................................................................................................................  Patient Representative Print Name and Relationship to Patient    ..................................................               ................................................  Date                                   Time    ..........................................................................................................................................  Reviewed by Signature/Title    ...................................................              ..............................................  Date                                               Time          22EPIC Rev 08/18

## 2018-10-09 ENCOUNTER — MYC MEDICAL ADVICE (OUTPATIENT)
Dept: FAMILY MEDICINE | Facility: CLINIC | Age: 48
End: 2018-10-09

## 2018-10-09 DIAGNOSIS — R07.89 ATYPICAL CHEST PAIN: Primary | ICD-10-CM

## 2018-10-09 NOTE — TELEPHONE ENCOUNTER
Patient was seen in the ED on 10/1 for atypical chest pain, noted per ER providers recommendations she should schedule a stress test. Cardiac stress test order placed to approve. Please advise in the absence of patients provider. Delaney Alonso LPN

## 2018-10-10 NOTE — TELEPHONE ENCOUNTER
Per Josselin Lino MD ok to sign off on stress test orders while Dr. Callahan is out of office. However, patient should continue to follow up with Dr. Callahan and he should get this report. TC to please help patient set up appointment. Thank you,  Isaura Cameron CMA

## 2018-10-10 NOTE — TELEPHONE ENCOUNTER
Stress test order signed via verbal okay from covering provider, Dr. Lino. [employer] message sent to patient to inform of order being placed and to inquirer on were she is wanting this faxed to. Delaney Alonso LPN

## 2018-10-11 NOTE — TELEPHONE ENCOUNTER
Patient did respond to [employer] message and states she does not need order faxed, the facility in [place] has access to epic to pull up order for her Stress test. Delaney Alonso LPN

## 2018-12-04 DIAGNOSIS — F41.1 GAD (GENERALIZED ANXIETY DISORDER): ICD-10-CM

## 2018-12-04 RX ORDER — CITALOPRAM HYDROBROMIDE 20 MG/1
30 TABLET ORAL DAILY
Qty: 45 TABLET | Refills: 1 | Status: SHIPPED | OUTPATIENT
Start: 2018-12-04 | End: 2019-02-26

## 2019-02-06 ENCOUNTER — OFFICE VISIT (OUTPATIENT)
Dept: ORTHOPEDICS | Facility: CLINIC | Age: 49
End: 2019-02-06
Payer: OTHER MISCELLANEOUS

## 2019-02-06 ENCOUNTER — ANCILLARY PROCEDURE (OUTPATIENT)
Dept: GENERAL RADIOLOGY | Facility: CLINIC | Age: 49
End: 2019-02-06
Attending: ORTHOPAEDIC SURGERY
Payer: OTHER MISCELLANEOUS

## 2019-02-06 VITALS
WEIGHT: 191 LBS | SYSTOLIC BLOOD PRESSURE: 125 MMHG | DIASTOLIC BLOOD PRESSURE: 84 MMHG | BODY MASS INDEX: 30.7 KG/M2 | HEART RATE: 82 BPM | HEIGHT: 66 IN

## 2019-02-06 DIAGNOSIS — S43.431A SUPERIOR GLENOID LABRUM LESION OF RIGHT SHOULDER, INITIAL ENCOUNTER: ICD-10-CM

## 2019-02-06 DIAGNOSIS — S49.91XA INJURY OF RIGHT SHOULDER, INITIAL ENCOUNTER: ICD-10-CM

## 2019-02-06 DIAGNOSIS — G56.21 CUBITAL TUNNEL SYNDROME ON RIGHT: ICD-10-CM

## 2019-02-06 DIAGNOSIS — S49.91XA INJURY OF RIGHT SHOULDER, INITIAL ENCOUNTER: Primary | ICD-10-CM

## 2019-02-06 PROCEDURE — 73030 X-RAY EXAM OF SHOULDER: CPT | Mod: TC

## 2019-02-06 PROCEDURE — 99204 OFFICE O/P NEW MOD 45 MIN: CPT | Performed by: ORTHOPAEDIC SURGERY

## 2019-02-06 ASSESSMENT — MIFFLIN-ST. JEOR: SCORE: 1505.18

## 2019-02-06 ASSESSMENT — PAIN SCALES - GENERAL: PAINLEVEL: MODERATE PAIN (5)

## 2019-02-06 NOTE — PROGRESS NOTES
ORTHOPEDIC CLINIC CONSULT      Sarika VASQUEZ is a 49 year old female who is seen in consultation at the request of Self.  History of Present illness:  Sarika presents for evaluation of:   1.) Right shoulder    Onset:  12/28/18    Symptoms brought on by Fall out of truck on ice and held onto the grab bar of truck while falling with right shoulder.   Character:  sharp, burning, throbbing, numbness and tingling in the Front of biceps.  Has some hand symptoms with fingers being numb/tingling.    Progression of symptoms:  Intermittent, has every day but actions with arm bring on pain  Previous similar pain: no .   Pain Level:  5/10.   Previous treatments:  rest/inactivity, heat, ice, acetaminophen, Ibuprofen and physical therapy.  Currently on Blood thinners? No  Diagnosis of Diabetes? No      Orthopedic Consult        Patient presents with:  Consult  Work Comp: DOI: 12/28/18      The above note was reviewed and verified.    Patient did not report to me the location of this occurrence where she fell out of the truck.  The described mechanism of injury is that as she was slipping on some ice she was grabbing with her right hand onto a grab bar.  She describes a hyper abduction type mechanism of injury to the shoulder.  She describes immediate lightening bolt type pain in addition to immediate severe shoulder discomfort.    She was initially seen elsewhere where she was referred to physical therapy which has been accomplished.  The patient reports that the therapist thinks she have a SLAP lesion.    Overall symptoms are indeed that of right shoulder aching pain and difficulty with reaching out.  Pain does refer down into the upper arm region.  This is both anteriorly and posteriorly.  Secondarily she is complaining of numbness and tingling in her hand primarily in the ulnar nerve distribution.  She gets nighttime numbness and tingling which is started to wake her up more frequently.    Prior history of related  problems:  No    Patient's past medical, surgical, social and family histories reviewed.     Past Medical History:   Diagnosis Date     Anxiety      Depressive disorder, not elsewhere classified      HEADACHE 4/24/2006     Hyperlipidaemia      Palpitations      Ulcer      Ventricular tachycardia (H)        Past Surgical History:   Procedure Laterality Date     CYSTOSCOPY  5/6/2013    Procedure: CYSTOSCOPY;;  Surgeon: Bhaskar Dougherty MD;  [facility]     CYSTOSCOPY N/A 10/2/2014    Procedure: CYSTOSCOPY;  Surgeon: Gomez Jules MD;  Location: [facility] OR     GYN SURGERY      hysterectomy, total     HC REDUCTION OF LARGE BREAST  2001     HYSTERECTOMY, PAP NO LONGER INDICATED       LAPAROSCOPIC ASSISTED HYSTERECTOMY VAGINAL, BILATERAL SALPINGO-OOPHORECTOMY, COMBINED  5/6/2013    Procedure: COMBINED LAPAROSCOPIC ASSISTED HYSTERECTOMY VAGINAL, SALPINGO-OOPHORECTOMY;  combined laparoscopic assisted hysterectomy vagainal , salpingo oophorectomy with cystoscopy;  Surgeon: Bhaskar Dougherty MD;  [facility]     SLING TRANSVAGINAL N/A 10/2/2014    Procedure: SLING TRANSVAGINAL;  Surgeon: Gomez Jules MD;  Location: [facility]       Medications:    Current Outpatient Medications on File Prior to Visit:  citalopram (CELEXA) 20 MG tablet Take 1.5 tablets (30 mg) by mouth daily   estradiol (ESTRACE) 1 MG tablet Take 1 tablet (1 mg) by mouth daily   Prenatal Vit-Fe Fumarate-FA (PRENATAL MULTIVITAMIN  PLUS IRON) 27-0.8 MG TABS Take 1 tablet by mouth daily   SUMAtriptan (IMITREX) 100 MG tablet Take 1 tablet (100 mg) by mouth at onset of headache for migraine May repeat in 2 hours. Max 2 tablets/24 hours.   valACYclovir (VALTREX) 1000 mg tablet Take 2 tablets (2,000 mg) every 12 hours for 1 day.   VITAMIN D, CHOLECALCIFEROL, PO Take 2,000 Units by mouth daily   penciclovir (DENAVIR) 1 % cream Apply topically every 2 hours (while awake) (Patient not taking: Reported on 11/13/2017)     No current  "facility-administered medications on file prior to visit.     Allergies   Allergen Reactions     No Known Drug Allergies        Social History     Occupational History     Not on file   Tobacco Use     Smoking status: Former Smoker     Packs/day: 1.00     Years: 10.00     Pack years: 10.00     Types: Cigarettes     Last attempt to quit: 3/26/2013     Years since quittin.8     Smokeless tobacco: Never Used     Tobacco comment: 1ppd   Substance and Sexual Activity     Alcohol use: Yes     Comment: Once/week     Drug use: No     Sexual activity: Yes     Partners: Male     Birth control/protection: Surgical, Female Surgical     Comment: vas       Family History   Adopted: Yes   Problem Relation Age of Onset     Unknown/Adopted Mother      Diabetes Father         patient does not communicate with dad due to her being adopted     Unknown/Adopted Maternal Grandmother      Unknown/Adopted Maternal Grandfather      Unknown/Adopted Paternal Grandmother      Unknown/Adopted Paternal Grandfather      Unknown/Adopted Brother      Unknown/Adopted Sister      Unknown/Adopted Son      Unknown/Adopted Daughter        REVIEW OF SYSTEMS    General: negative for fever or fatigue    Psych:  negative for anxiety or depression     Ophthalmic:  Corrective lenses?  Not applicable    ENT:  Hearing difficulty? No    CV: negative for chest pain, venous insuffiencey     Endocrine:  negative for diabetes     Urology:  negative for kidney disease    Resp:  Normal respiratory effort     Skin: negative for cuts/sores or redness    Musculoskeletal: as above    Neurologic:negative for numbness/tingling    Hematologic: negative for bleeding disorder, does not use of prescription anticoagulants         Physical Exam:    Vitals: /84 (BP Location: Left arm, Patient Position: Chair, Cuff Size: Adult Large)   Pulse 82   Ht 1.664 m (5' 5.5\")   Wt 86.6 kg (191 lb)   LMP 04/10/2013   BMI 31.30 kg/m    BMI= Body mass index is 31.3 " kg/m .    GENERAL APPEARANCE:  Healthy, alert, no distress    SKIN:  negative for suspicious lesions or rashes    NEURO: Normal strength and tone, mentation intact and speech normal    PSYCH:   Mentation appears Normal and affect normal/bright    RESPIRATORY: negative for respiratory distress.    EYES: negative for Conjunctivitis    Cardiovascular: no Edema                radial Present                Fingers warm and well perfused, brisk capillary refill.      GAIT: non-antalgic    JOINT/EXTREMITIES:    She has a small amount of tenderness over the right AC joint.  There is no limitations to her digits wrist or elbow range of motion.  Her active range of motion of the shoulder is limited because of pain.  She forward elevates to about 150 degrees.  Side abduction is most uncomfortable and she allows only about 90 degrees.  Her arm at side external rotation though is fairly symmetrical to the opposite side.  Behind back internal rotation is down by about 2 vertebral bodies.  Rotator cuff strength testing when carefully performed shows that she is able to get some degree of resistance in all planes.  Supraspinatus testing has to be done with her arm near her side as if she attempts to abduct in that position causes a lot of discomfort.  She does allow me to perform speeds testing which causes pain down into the arm anteriorly.  Active compression testing of [place] with supination is very poorly tolerated.        Palpation about her right elbow does show prominence of the ulnar nerve behind the medial epicondyle when the elbow is flexed past 90.  She does have a positive Tinel's at the elbow with radiation into the ulnar forearm and primarily the little finger.      Radiographs: A right shoulder x-ray was taken today and was reviewed reviewed.  The glenohumeral joint is extremely well-maintained.  She has only a slight curve to the acromion making it perhaps even a type I.  No soft tissue calcifications are  noted.    Independent visualization of the images was performed.      Impression:     ICD-10-CM    1. Injury of right shoulder, initial encounter S49.91XA XR Shoulder Right G/E 3 Views   2. Superior glenoid labrum lesion of right shoulder, initial encounter S43.431A MR Shoulder Right w Contrast     XR Shoulder Contrast CT/MR Injection   3. Cubital tunnel syndrome on right G56.21        Patient has a recent right shoulder injury with the mechanism of injury that is suspicious for shoulder dislocation.  Physical findings are somewhat consistent with a superior labral injury as well.  He does have some mild rotator cuff findings.    She is only about 5 weeks post injury.    She has numbness and tingling in her right upper extremity that looks to be consistent with ulnar nerve injury.\    She has already been attending physical therapy and has not been improving            Plan:  The above was reviewed with Sarika    We will refer her for right shoulder arthrogram MRI.  We will be looking for labral damage as her primary pathology.    She and her boyfriend were then instructed on nighttime ulnar nerve splinting.  The rationale for this treatment and the hopeful outcomes were explained.    Return to clinic I would like her to return to discuss the results of her nighttime ulnar nerve splinting and to review the results of her MRI.    Wilmer Mccall MD

## 2019-02-06 NOTE — LETTER
2/6/2019         RE: Sarika VASQUEZ  [address] [facility] [place] [address]        Dear Colleague,    Thank you for referring your patient, Sarika VASQUEZ, to the [facility]. Please see a copy of my visit note below.    ORTHOPEDIC CLINIC CONSULT      Sarika VASQUEZ is a 49 year old female who is seen in consultation at the request of Self.  History of Present illness:  Sarika presents for evaluation of:   1.) Right shoulder    Onset:  12/28/18    Symptoms brought on by Fall out of truck on ice and held onto the grab bar of truck while falling with right shoulder.   Character:  sharp, burning, throbbing, numbness and tingling in the Front of biceps.  Has some hand symptoms with fingers being numb/tingling.    Progression of symptoms:  Intermittent, has every day but actions with arm bring on pain  Previous similar pain: no .   Pain Level:  5/10.   Previous treatments:  rest/inactivity, heat, ice, acetaminophen, Ibuprofen and physical therapy.  Currently on Blood thinners? No  Diagnosis of Diabetes? No      Orthopedic Consult        Patient presents with:  Consult  Work Comp: DOI: 12/28/18      The above note was reviewed and verified.    Patient did not report to me the location of this occurrence where she fell out of the truck.  The described mechanism of injury is that as she was slipping on some ice she was grabbing with her right hand onto a grab bar.  She describes a hyper abduction type mechanism of injury to the shoulder.  She describes immediate lightening bolt type pain in addition to immediate severe shoulder discomfort.    She was initially seen elsewhere where she was referred to physical therapy which has been accomplished.  The patient reports that the therapist thinks she have a SLAP lesion.    Overall symptoms are indeed that of right shoulder aching pain and difficulty with reaching out.  Pain does refer down into the upper arm region.  This is both anteriorly and  posteriorly.  Secondarily she is complaining of numbness and tingling in her hand primarily in the ulnar nerve distribution.  She gets nighttime numbness and tingling which is started to wake her up more frequently.    Prior history of related problems:  No    Patient's past medical, surgical, social and family histories reviewed.     Past Medical History:   Diagnosis Date     Anxiety      Depressive disorder, not elsewhere classified      HEADACHE 4/24/2006     Hyperlipidaemia      Palpitations      Ulcer      Ventricular tachycardia (H)        Past Surgical History:   Procedure Laterality Date     CYSTOSCOPY  5/6/2013    Procedure: CYSTOSCOPY;;  Surgeon: Bhaskar Dougherty MD;  Location: [facility]     CYSTOSCOPY N/A 10/2/2014    Procedure: CYSTOSCOPY;  Surgeon: Gomez Jules MD;  Location: [facility] OR     GYN SURGERY      hysterectomy, total     HC REDUCTION OF LARGE BREAST  2001     HYSTERECTOMY, PAP NO LONGER INDICATED       LAPAROSCOPIC ASSISTED HYSTERECTOMY VAGINAL, BILATERAL SALPINGO-OOPHORECTOMY, COMBINED  5/6/2013    Procedure: COMBINED LAPAROSCOPIC ASSISTED HYSTERECTOMY VAGINAL, SALPINGO-OOPHORECTOMY;  combined laparoscopic assisted hysterectomy vagainal , salpingo oophorectomy with cystoscopy;  Surgeon: Bhaskar Dougherty MD;  Location: [facility] OR     SLING TRANSVAGINAL N/A 10/2/2014    Procedure: SLING TRANSVAGINAL;  Surgeon: Gomez Jules MD;  Location: [facility]       Medications:    Current Outpatient Medications on File Prior to Visit:  citalopram (CELEXA) 20 MG tablet Take 1.5 tablets (30 mg) by mouth daily   estradiol (ESTRACE) 1 MG tablet Take 1 tablet (1 mg) by mouth daily   Prenatal Vit-Fe Fumarate-FA (PRENATAL MULTIVITAMIN  PLUS IRON) 27-0.8 MG TABS Take 1 tablet by mouth daily   SUMAtriptan (IMITREX) 100 MG tablet Take 1 tablet (100 mg) by mouth at onset of headache for migraine May repeat in 2 hours. Max 2 tablets/24 hours.   valACYclovir (VALTREX) 1000 mg tablet Take 2 tablets  (2,000 mg) every 12 hours for 1 day.   VITAMIN D, CHOLECALCIFEROL, PO Take 2,000 Units by mouth daily   penciclovir (DENAVIR) 1 % cream Apply topically every 2 hours (while awake) (Patient not taking: Reported on 2017)     No current facility-administered medications on file prior to visit.     Allergies   Allergen Reactions     No Known Drug Allergies        Social History     Occupational History     Not on file   Tobacco Use     Smoking status: Former Smoker     Packs/day: 1.00     Years: 10.00     Pack years: 10.00     Types: Cigarettes     Last attempt to quit: 3/26/2013     Years since quittin.8     Smokeless tobacco: Never Used     Tobacco comment: 1ppd   Substance and Sexual Activity     Alcohol use: Yes     Comment: Once/week     Drug use: No     Sexual activity: Yes     Partners: Male     Birth control/protection: Surgical, Female Surgical     Comment: vas       Family History   Adopted: Yes   Problem Relation Age of Onset     Unknown/Adopted Mother      Diabetes Father         patient does not communicate with dad due to her being adopted     Unknown/Adopted Maternal Grandmother      Unknown/Adopted Maternal Grandfather      Unknown/Adopted Paternal Grandmother      Unknown/Adopted Paternal Grandfather      Unknown/Adopted Brother      Unknown/Adopted Sister      Unknown/Adopted Son      Unknown/Adopted Daughter        REVIEW OF SYSTEMS    General: negative for fever or fatigue    Psych:  negative for anxiety or depression     Ophthalmic:  Corrective lenses?  Not applicable    ENT:  Hearing difficulty? No    CV: negative for chest pain, venous insuffiencey     Endocrine:  negative for diabetes     Urology:  negative for kidney disease    Resp:  Normal respiratory effort     Skin: negative for cuts/sores or redness    Musculoskeletal: as above    Neurologic:negative for numbness/tingling    Hematologic: negative for bleeding disorder, does not use of prescription anticoagulants         Physical  "Exam:    Vitals: /84 (BP Location: Left arm, Patient Position: Chair, Cuff Size: Adult Large)   Pulse 82   Ht 1.664 m (5' 5.5\")   Wt 86.6 kg (191 lb)   LMP 04/10/2013   BMI 31.30 kg/m     BMI= Body mass index is 31.3 kg/m .    GENERAL APPEARANCE:  Healthy, alert, no distress    SKIN:  negative for suspicious lesions or rashes    NEURO: Normal strength and tone, mentation intact and speech normal    PSYCH:   Mentation appears Normal and affect normal/bright    RESPIRATORY: negative for respiratory distress.    EYES: negative for Conjunctivitis    Cardiovascular: no Edema                radial Present                Fingers warm and well perfused, brisk capillary refill.      GAIT: non-antalgic    JOINT/EXTREMITIES:    She has a small amount of tenderness over the right AC joint.  There is no limitations to her digits wrist or elbow range of motion.  Her active range of motion of the shoulder is limited because of pain.  She forward elevates to about 150 degrees.  Side abduction is most uncomfortable and she allows only about 90 degrees.  Her arm at side external rotation though is fairly symmetrical to the opposite side.  Behind back internal rotation is down by about 2 vertebral bodies.  Rotator cuff strength testing when carefully performed shows that she is able to get some degree of resistance in all planes.  Supraspinatus testing has to be done with her arm near her side as if she attempts to abduct in that position causes a lot of discomfort.  She does allow me to perform speeds testing which causes pain down into the arm anteriorly.  Active compression testing of [place] with supination is very poorly tolerated.        Palpation about her right elbow does show prominence of the ulnar nerve behind the medial epicondyle when the elbow is flexed past 90.  She does have a positive Tinel's at the elbow with radiation into the ulnar forearm and primarily the little finger.      Radiographs: A right " shoulder x-ray was taken today and was reviewed reviewed.  The glenohumeral joint is extremely well-maintained.  She has only a slight curve to the acromion making it perhaps even a type I.  No soft tissue calcifications are noted.    Independent visualization of the images was performed.      Impression:     ICD-10-CM    1. Injury of right shoulder, initial encounter S49.91XA XR Shoulder Right G/E 3 Views   2. Superior glenoid labrum lesion of right shoulder, initial encounter S43.431A MR Shoulder Right w Contrast     XR Shoulder Contrast CT/MR Injection   3. Cubital tunnel syndrome on right G56.21        Patient has a recent right shoulder injury with the mechanism of injury that is suspicious for shoulder dislocation.  Physical findings are somewhat consistent with a superior labral injury as well.  He does have some mild rotator cuff findings.    She is only about 5 weeks post injury.    She has numbness and tingling in her right upper extremity that looks to be consistent with ulnar nerve injury.\    She has already been attending physical therapy and has not been improving            Plan:  The above was reviewed with Sarika    We will refer her for right shoulder arthrogram MRI.  We will be looking for labral damage as her primary pathology.    She and her boyfriend were then instructed on nighttime ulnar nerve splinting.  The rationale for this treatment and the hopeful outcomes were explained.    Return to clinic I would like her to return to discuss the results of her nighttime ulnar nerve splinting and to review the results of her MRI.    Wilmer Mccall MD                    Again, thank you for allowing me to participate in the care of your patient.        Sincerely,        Wilmer Mccall MD

## 2019-02-13 ENCOUNTER — HOSPITAL ENCOUNTER (OUTPATIENT)
Dept: GENERAL RADIOLOGY | Facility: CLINIC | Age: 49
End: 2019-02-13
Attending: ORTHOPAEDIC SURGERY
Payer: OTHER MISCELLANEOUS

## 2019-02-13 ENCOUNTER — HOSPITAL ENCOUNTER (OUTPATIENT)
Dept: MRI IMAGING | Facility: CLINIC | Age: 49
Discharge: HOME | End: 2019-02-13
Attending: ORTHOPAEDIC SURGERY | Admitting: ORTHOPAEDIC SURGERY
Payer: OTHER MISCELLANEOUS

## 2019-02-13 DIAGNOSIS — S43.431A SUPERIOR GLENOID LABRUM LESION OF RIGHT SHOULDER, INITIAL ENCOUNTER: ICD-10-CM

## 2019-02-13 PROCEDURE — 73222 MRI JOINT UPR EXTREM W/DYE: CPT | Mod: RT

## 2019-02-13 PROCEDURE — 23350 INJECTION FOR SHOULDER X-RAY: CPT

## 2019-02-13 PROCEDURE — A9585 GADOBUTROL INJECTION: HCPCS | Performed by: ORTHOPAEDIC SURGERY

## 2019-02-13 PROCEDURE — 25500064 ZZH RX 255 OP 636: Performed by: ORTHOPAEDIC SURGERY

## 2019-02-13 RX ORDER — IOPAMIDOL 408 MG/ML
50 INJECTION, SOLUTION INTRAVASCULAR ONCE
Status: COMPLETED | OUTPATIENT
Start: 2019-02-13 | End: 2019-02-13

## 2019-02-13 RX ORDER — GADOBUTROL 604.72 MG/ML
0.1 INJECTION INTRAVENOUS ONCE
Status: COMPLETED | OUTPATIENT
Start: 2019-02-13 | End: 2019-02-13

## 2019-02-13 RX ORDER — LIDOCAINE HYDROCHLORIDE 10 MG/ML
5 INJECTION, SOLUTION EPIDURAL; INFILTRATION; INTRACAUDAL; PERINEURAL ONCE
Status: COMPLETED | OUTPATIENT
Start: 2019-02-13 | End: 2019-02-13

## 2019-02-13 RX ADMIN — GADOBUTROL 0.6 ML: 604.72 INJECTION INTRAVENOUS at 08:39

## 2019-02-13 RX ADMIN — IOPAMIDOL 6 ML: 408 INJECTION, SOLUTION INTRAVASCULAR at 08:39

## 2019-02-13 RX ADMIN — LIDOCAINE HYDROCHLORIDE 2 ML: 10 INJECTION, SOLUTION EPIDURAL; INFILTRATION; INTRACAUDAL; PERINEURAL at 08:38

## 2019-02-18 ENCOUNTER — TELEPHONE (OUTPATIENT)
Dept: ORTHOPEDICS | Facility: CLINIC | Age: 49
End: 2019-02-18

## 2019-02-18 ENCOUNTER — OFFICE VISIT (OUTPATIENT)
Dept: ORTHOPEDICS | Facility: CLINIC | Age: 49
End: 2019-02-18
Payer: OTHER MISCELLANEOUS

## 2019-02-18 VITALS
SYSTOLIC BLOOD PRESSURE: 124 MMHG | HEIGHT: 66 IN | HEART RATE: 90 BPM | DIASTOLIC BLOOD PRESSURE: 84 MMHG | BODY MASS INDEX: 30.7 KG/M2 | WEIGHT: 191 LBS

## 2019-02-18 DIAGNOSIS — S49.91XA INJURY OF RIGHT SHOULDER, INITIAL ENCOUNTER: Primary | ICD-10-CM

## 2019-02-18 DIAGNOSIS — M25.311 INSTABILITY OF RIGHT SHOULDER JOINT: ICD-10-CM

## 2019-02-18 PROCEDURE — 99213 OFFICE O/P EST LOW 20 MIN: CPT | Performed by: ORTHOPAEDIC SURGERY

## 2019-02-18 ASSESSMENT — PAIN SCALES - GENERAL: PAINLEVEL: MODERATE PAIN (4)

## 2019-02-18 ASSESSMENT — MIFFLIN-ST. JEOR: SCORE: 1505.18

## 2019-02-18 NOTE — PROGRESS NOTES
"Office Visit-Follow up  HISTORY OF PRESENT ILLNESS:    Sarika VASQUEZ is a 49 year old female who is seen in follow up for   Chief Complaint   Patient presents with     RECHECK     Right shoulder pain     Work Comp       Patient presents with:  RECHECK: Right shoulder pain  Work Comp      Patient is returning to discuss the results of her arthrogram MRI.  Present symptoms: She is still having a lot of difficulty with shoulder pain and difficulty with range of motion.  The tingling in her elbow was not brought up to a significant degree.  She however reported that the nighttime splinting of her elbow that we recommended led to shoulder pain so she is just continued the nighttime splinting.  Treatments tried to this point: She has attended physical therapy which began very shortly after shoulder injury.  She has not been doing this for the last 2 weeks.  We did attempt a nighttime elbow splinting for cubital tunnel syndrome.    REVIEW OF SYSTEMS    General: negative for, night sweats, dizziness, fatigue  Resp: No shortness of breath and no cough  CV: negative for chest pain, syncope or near-syncope  GI: negative for nausea, vomiting and diarrhea  : negative for dysuria and hematuria  Musculoskeletal: as above  Neurologic: negative for syncope   Hematologic: negative for bleeding disorder    Physical Exam:    Vitals: /84   Pulse 90   Ht 1.664 m (5' 5.5\")   Wt 86.6 kg (191 lb)   LMP 04/10/2013   BMI 31.30 kg/m    BMI= Body mass index is 31.3 kg/m .  Constitutional: healthy, alert and no acute distress   Psychiatric: mentation appears normal and affect normal/bright  NEURO: no focal deficits  SKIN: no excoriation or erythema. No signs of infection.    GAIT: non-antalgic    JOINT/EXTREMITIES:     She does not have tenderness about the shoulder to palpation.  She does have limitations of shoulder range of motion.  Her forward elevation is only about 140 degrees.  Side abduction is maybe 90 degrees.  Arm " at side external rotation is seemingly symmetrical to the opposite side with some minor limitations.  Behind back internal rotation is down by 3 vertebral body.  She is able to mount resistance as we test the rotator cuff in all planes.  This is limited by discomfort.    I am unable to elicit any signs of instability today she is too uncomfortable.    IMAGING INTERPRETATION: Her MRI was carefully reviewed and the report was carefully reviewed.  The radiology report only alludes to some hypertrophic acromial clavicular joint changes but otherwise describe this is a normal MRI.  My interpretation I see some mild irregularity to the inferior anterior capsular attachment off the glenoid.  I also moved a little bit suspicious that she may have findings that could be consistent with a avulsion of the capsule off the humerus.       Impression:      ICD-10-CM    1. Injury of right shoulder, initial encounter S49.91XA PHYSICAL THERAPY REFERRAL     ORTHO  REFERRAL   2. Instability of right shoulder joint M25.311 PHYSICAL THERAPY REFERRAL     ORTHO  REFERRAL       Patient is only 6 weeks after an acute right shoulder injury.  The mechanism of this injury would be consistent with anterior subluxation of the shoulder or perhaps a labral tear.  An arthrogram MRI does not show findings of a labral injury superiorly.  Officially the radiologist does not see evidence of anterior inferior labral injury I believe there may be some some suspicious changes.    She however does not have significant pathology that we should hinder her ability to recover from this injury at least primarily.  Even if she had shoulder subluxation or full dislocation the initial treatment of this injury is symptomatically with progressive strengthening and then following up the clinical course.  She claims she is getting worse.    I am a little suspicious that she started rehab too soon following an acute injury.    He started rehab that  early by her own request as she feels she has time limitations to allow herself to recover.      Plan:   The above was reviewed with Sarika and I explained to her all of the above.  Specifically that I cannot be certain of the findings on her MRI, I cannot be certain of her physical findings, and she should be progressing no matter what her injury represented..     She was slightly disgruntled that we did not have a definite diagnosis and some way to fix her shoulder at this time.    I suggestion is that we refer her back to physical therapy with the hopes that her previous experience was complicated by her being treated too soon following the acute injury.  I also suggested that perhaps she should see a specific orthopedic shoulder surgeon who may have a better interpretation of her MRI.  That consult I believe would also be benefited by the addition of return to physical therapy.    She was agreeable to this program.  Prescription was written so that she could continue therapy in [place].  She will have a  referral for a [facility] shoulder surgeon.      Return to clinic 3, weeks, or PRN, if she has found a different direction to take.    Wilmer Mccall MD

## 2019-02-18 NOTE — LETTER
"    2/18/2019         RE: Sarika VASQUEZ  [address] [place] [address]        Dear Colleague,    Thank you for referring your patient, Sarika VASQUEZ, to the [facility]. Please see a copy of my visit note below.    Office Visit-Follow up  HISTORY OF PRESENT ILLNESS:    Sarika VASQUEZ is a 49 year old female who is seen in follow up for   Chief Complaint   Patient presents with     RECHECK     Right shoulder pain     Work Comp       Patient presents with:  RECHECK: Right shoulder pain  Work Comp      Patient is returning to discuss the results of her arthrogram MRI.  Present symptoms: She is still having a lot of difficulty with shoulder pain and difficulty with range of motion.  The tingling in her elbow was not brought up to a significant degree.  She however reported that the nighttime splinting of her elbow that we recommended led to shoulder pain so she is just continued the nighttime splinting.  Treatments tried to this point: She has attended physical therapy which began very shortly after shoulder injury.  She has not been doing this for the last 2 weeks.  We did attempt a nighttime elbow splinting for cubital tunnel syndrome.    REVIEW OF SYSTEMS    General: negative for, night sweats, dizziness, fatigue  Resp: No shortness of breath and no cough  CV: negative for chest pain, syncope or near-syncope  GI: negative for nausea, vomiting and diarrhea  : negative for dysuria and hematuria  Musculoskeletal: as above  Neurologic: negative for syncope   Hematologic: negative for bleeding disorder    Physical Exam:    Vitals: /84   Pulse 90   Ht 1.664 m (5' 5.5\")   Wt 86.6 kg (191 lb)   LMP 04/10/2013   BMI 31.30 kg/m     BMI= Body mass index is 31.3 kg/m .  Constitutional: healthy, alert and no acute distress   Psychiatric: mentation appears normal and affect normal/bright  NEURO: no focal deficits  SKIN: no excoriation or erythema. No signs of infection.    GAIT: " non-antalgic    JOINT/EXTREMITIES:     She does not have tenderness about the shoulder to palpation.  She does have limitations of shoulder range of motion.  Her forward elevation is only about 140 degrees.  Side abduction is maybe 90 degrees.  Arm at side external rotation is seemingly symmetrical to the opposite side with some minor limitations.  Behind back internal rotation is down by 3 vertebral body.  She is able to mount resistance as we test the rotator cuff in all planes.  This is limited by discomfort.    I am unable to elicit any signs of instability today she is too uncomfortable.    IMAGING INTERPRETATION: Her MRI was carefully reviewed and the report was carefully reviewed.  The radiology report only alludes to some hypertrophic acromial clavicular joint changes but otherwise describe this is a normal MRI.  My interpretation I see some mild irregularity to the inferior anterior capsular attachment off the glenoid.  I also moved a little bit suspicious that she may have findings that could be consistent with a avulsion of the capsule off the humerus.       Impression:      ICD-10-CM    1. Injury of right shoulder, initial encounter S49.91XA PHYSICAL THERAPY REFERRAL     ORTHO  REFERRAL   2. Instability of right shoulder joint M25.311 PHYSICAL THERAPY REFERRAL     ORTHO  REFERRAL       Patient is only 6 weeks after an acute right shoulder injury.  The mechanism of this injury would be consistent with anterior subluxation of the shoulder or perhaps a labral tear.  An arthrogram MRI does not show findings of a labral injury superiorly.  Officially the radiologist does not see evidence of anterior inferior labral injury I believe there may be some some suspicious changes.    She however does not have significant pathology that we should hinder her ability to recover from this injury at least primarily.  Even if she had shoulder subluxation or full dislocation the initial treatment of this  injury is symptomatically with progressive strengthening and then following up the clinical course.  She claims she is getting worse.    I am a little suspicious that she started rehab too soon following an acute injury.    He started rehab that early by her own request as she feels she has time limitations to allow herself to recover.      Plan:   The above was reviewed with Sarika and I explained to her all of the above.  Specifically that I cannot be certain of the findings on her MRI, I cannot be certain of her physical findings, and she should be progressing no matter what her injury represented..     She was slightly disgruntled that we did not have a definite diagnosis and some way to fix her shoulder at this time.    I suggestion is that we refer her back to physical therapy with the hopes that her previous experience was complicated by her being treated too soon following the acute injury.  I also suggested that perhaps she should see a specific orthopedic shoulder surgeon who may have a better interpretation of her MRI.  That consult I believe would also be benefited by the addition of return to physical therapy.    She was agreeable to this program.  Prescription was written so that she could continue therapy in [place].  She will have a  referral for a [facility] shoulder surgeon.      Return to clinic 3, weeks, or PRN, if she has found a different direction to take.    Wilmer Mccall MD    Again, thank you for allowing me to participate in the care of your patient.        Sincerely,        Wilmer Mccall MD

## 2019-02-18 NOTE — TELEPHONE ENCOUNTER
Our goal is to have forms completed with 72 hours, however some forms may require a visit or additional information.    Who is the form from?: Patient - [facility]  Where the form came from: Patient or family brought in     What clinic location was the form placed at?: [employer]  Where the form was placed: [facility]  What number is listed as a contact on the form?: Erin Leyva [phone]    Phone call message- patient request for a letter, form or note:    Date needed: as soon as possible  Please fax to [phone]  Mail copy to patient [id number] [facility] [address]   Has the patient signed a consent form for release of information? YES    Additional comments:     Call taken on 2/18/2019 at 3:55 PM by Pam Kulkarni    Type of letter, form or note: [facility]

## 2019-02-26 ENCOUNTER — MYC REFILL (OUTPATIENT)
Dept: FAMILY MEDICINE | Facility: CLINIC | Age: 49
End: 2019-02-26

## 2019-02-26 DIAGNOSIS — F41.1 GAD (GENERALIZED ANXIETY DISORDER): ICD-10-CM

## 2019-02-27 NOTE — TELEPHONE ENCOUNTER
"Message handled by Nurse Triage with [employer] - provider name: Dr. Callahan.  OK to give 1 month refill and she will need to follow up with either an Evisit or office visit for additional refills.  Sent info via [employer].  MIKE Arias, RN              Requested Prescriptions   Pending Prescriptions Disp Refills     citalopram (CELEXA) 20 MG tablet 45 tablet 1    Last Written Prescription Date:  12/4/18  Last Fill Quantity: 45,  # refills: 1   Last office visit: 9/10/2018 with prescribing provider:     Future Office Visit:     Sig: Take 1.5 tablets (30 mg) by mouth daily    SSRIs Protocol Passed - 2/26/2019 10:29 AM   PHQ-9 SCORE 11/13/2017 7/16/2018 9/10/2018   PHQ-9 Total Score - - -   PHQ-9 Total Score 10 10 2         Passed - Recent (12 mo) or future (30 days) visit within the authorizing provider's specialty    Patient had office visit in the last 12 months or has a visit in the next 30 days with authorizing provider or within the authorizing provider's specialty.  See \"Patient Info\" tab in inbasket, or \"Choose Columns\" in Meds & Orders section of the refill encounter.             Passed - Medication is active on med list       Passed - Patient is age 18 or older       Passed - No active pregnancy on record       Passed - No positive pregnancy test in last 12 months        Patient was to check in to let PCP know how the 30 mg dose of Celexa was working for her.  Krishidhan Seedst sent asking if this is working for her.  Will wait for a response.  MIKE Arias, RN    "

## 2019-02-28 RX ORDER — CITALOPRAM HYDROBROMIDE 20 MG/1
30 TABLET ORAL DAILY
Qty: 45 TABLET | Refills: 0 | Status: SHIPPED | OUTPATIENT
Start: 2019-02-28 | End: 2019-03-06

## 2019-03-06 ENCOUNTER — E-VISIT (OUTPATIENT)
Dept: FAMILY MEDICINE | Facility: CLINIC | Age: 49
End: 2019-03-06
Payer: COMMERCIAL

## 2019-03-06 DIAGNOSIS — F41.1 GAD (GENERALIZED ANXIETY DISORDER): ICD-10-CM

## 2019-03-06 PROCEDURE — 99444 ZZC PHYSICIAN ONLINE EVALUATION & MANAGEMENT SERVICE: CPT | Performed by: FAMILY MEDICINE

## 2019-03-06 RX ORDER — CITALOPRAM HYDROBROMIDE 20 MG/1
30 TABLET ORAL DAILY
Qty: 135 TABLET | Refills: 1 | Status: SHIPPED | OUTPATIENT
Start: 2019-03-06 | End: 2019-04-05

## 2019-03-12 ENCOUNTER — TRANSFERRED RECORDS (OUTPATIENT)
Dept: HEALTH INFORMATION MANAGEMENT | Facility: CLINIC | Age: 49
End: 2019-03-12

## 2019-04-05 ENCOUNTER — OFFICE VISIT (OUTPATIENT)
Dept: FAMILY MEDICINE | Facility: CLINIC | Age: 49
End: 2019-04-05
Payer: OTHER MISCELLANEOUS

## 2019-04-05 VITALS
SYSTOLIC BLOOD PRESSURE: 126 MMHG | BODY MASS INDEX: 32.65 KG/M2 | TEMPERATURE: 98.2 F | RESPIRATION RATE: 18 BRPM | OXYGEN SATURATION: 98 % | DIASTOLIC BLOOD PRESSURE: 74 MMHG | WEIGHT: 196 LBS | HEART RATE: 110 BPM | HEIGHT: 65 IN

## 2019-04-05 DIAGNOSIS — F41.1 GAD (GENERALIZED ANXIETY DISORDER): ICD-10-CM

## 2019-04-05 DIAGNOSIS — G44.209 TENSION HEADACHE: ICD-10-CM

## 2019-04-05 DIAGNOSIS — Z86.19 H/O COLD SORES: ICD-10-CM

## 2019-04-05 DIAGNOSIS — Z01.818 PREOP GENERAL PHYSICAL EXAM: Primary | ICD-10-CM

## 2019-04-05 DIAGNOSIS — F32.5 MAJOR DEPRESSIVE DISORDER WITH SINGLE EPISODE, IN FULL REMISSION (H): ICD-10-CM

## 2019-04-05 DIAGNOSIS — S46.211A TEAR OF RIGHT BICEPS MUSCLE, INITIAL ENCOUNTER: ICD-10-CM

## 2019-04-05 PROCEDURE — 99215 OFFICE O/P EST HI 40 MIN: CPT | Performed by: FAMILY MEDICINE

## 2019-04-05 RX ORDER — LORAZEPAM 0.5 MG/1
.5-1 TABLET ORAL DAILY PRN
Qty: 10 TABLET | Refills: 0 | Status: SHIPPED | OUTPATIENT
Start: 2019-04-05

## 2019-04-05 RX ORDER — CITALOPRAM HYDROBROMIDE 40 MG/1
40 TABLET ORAL DAILY
Qty: 90 TABLET | Refills: 1 | Status: SHIPPED | OUTPATIENT
Start: 2019-04-05

## 2019-04-05 ASSESSMENT — MIFFLIN-ST. JEOR: SCORE: 1519.93

## 2019-04-05 ASSESSMENT — ANXIETY QUESTIONNAIRES
5. BEING SO RESTLESS THAT IT IS HARD TO SIT STILL: NOT AT ALL
2. NOT BEING ABLE TO STOP OR CONTROL WORRYING: MORE THAN HALF THE DAYS
1. FEELING NERVOUS, ANXIOUS, OR ON EDGE: SEVERAL DAYS
GAD7 TOTAL SCORE: 7
7. FEELING AFRAID AS IF SOMETHING AWFUL MIGHT HAPPEN: SEVERAL DAYS
6. BECOMING EASILY ANNOYED OR IRRITABLE: SEVERAL DAYS
3. WORRYING TOO MUCH ABOUT DIFFERENT THINGS: MORE THAN HALF THE DAYS
IF YOU CHECKED OFF ANY PROBLEMS ON THIS QUESTIONNAIRE, HOW DIFFICULT HAVE THESE PROBLEMS MADE IT FOR YOU TO DO YOUR WORK, TAKE CARE OF THINGS AT HOME, OR GET ALONG WITH OTHER PEOPLE: NOT DIFFICULT AT ALL

## 2019-04-05 ASSESSMENT — PATIENT HEALTH QUESTIONNAIRE - PHQ9
5. POOR APPETITE OR OVEREATING: NOT AT ALL
SUM OF ALL RESPONSES TO PHQ QUESTIONS 1-9: 4

## 2019-04-05 ASSESSMENT — PAIN SCALES - GENERAL: PAINLEVEL: MODERATE PAIN (4)

## 2019-04-05 NOTE — PROGRESS NOTES
[address] [address]  [phone]  Dept: [phone]    PRE-OP EVALUATION:  Today's date: 2019    Sarika VASQUEZ (: 1970) presents for pre-operative evaluation assessment as requested by Dr. Otriz.  She requires evaluation and anesthesia risk assessment prior to undergoing surgery/procedure for treatment of bicep injury Work comp.  .    Fax number for surgical facility: [facility]   Primary Physician: Obinna Callahan  Type of Anesthesia Anticipated: General    Patient has a Health Care Directive or Living Will:  NO    Preop Questions 2019   Who is doing your surgery? dr ortiz   What are you having done? right shoulder   Date of Surgery/Procedure: 2019   Facility or Hospital where procedure/surgery will be performed: [facility]   1.  Do you have a history of Heart attack, stroke, stent, coronary bypass surgery, or other heart surgery? No   2.  Do you ever have any pain or discomfort in your chest? No   3.  Do you have a history of  Heart Failure? No   4.   Are you troubled by shortness of breath when:  walking on a level surface, or up a slight hill, or at night? No   5.  Do you currently have a cold, bronchitis or other respiratory infection? No   6.  Do you have a cough, shortness of breath, or wheezing? No   7.  Do you sometimes get pains in the calves of your legs when you walk? No   8. Do you or anyone in your family have previous history of blood clots? No   9.  Do you or does anyone in your family have a serious bleeding problem such as prolonged bleeding following surgeries or cuts? No   10. Have you ever had problems with anemia or been told to take iron pills? No   11. Have you had any abnormal blood loss such as black, tarry or bloody stools, or abnormal vaginal bleeding? No   12. Have you ever had a blood transfusion? No   13. Have you or any of your relatives ever had problems with anesthesia?  "No   14. Do you have sleep apnea, excessive snoring or daytime drowsiness? No- snores    15. Do you have any prosthetic heart valves? No   16. Do you have prosthetic joints? No   17. Is there any chance that you may be pregnant? No         HPI:     HPI related to upcoming procedure: slipped and fell 3 months ago and injured right shoulder.  Is recommended to have surgery.    Anxiety has worsened.  Currently on 30 mg of citalopram.  Has tried hydroxyzine and it has not been effective for as needed anxiety management.  She is at her \"wits end\" with all the pain and discomfort she has had and it has been difficult with her anxiety.      See problem list for active medical problems.  Problems all longstanding and stable, except as noted/documented.  See ROS for pertinent symptoms related to these conditions.                                                                                                                                                          .    MEDICAL HISTORY:     Patient Active Problem List    Diagnosis Date Noted     Positive NATACHA (antinuclear antibody) 02/11/2015     Priority: High     HUMBERTO (generalized anxiety disorder) 07/16/2018     Priority: Medium     Family history of diabetes mellitus 07/16/2018     Priority: Medium     Migraine with aura and without status migrainosus, not intractable 11/13/2017     Priority: Medium     H/O cold sores 07/19/2016     Priority: Medium     Obesity (BMI 30-39.9) 07/19/2016     Priority: Medium     Symptomatic menopausal or female climacteric states 04/22/2015     Priority: Medium     Pain in joint, multiple sites 02/16/2015     Priority: Medium     Pulmonary nodule 02/18/2014     Priority: Medium     Stable on CT 2/2014.  Recheck in 6 months.       Vitamin D deficiency 08/29/2012     Priority: Medium     Hyperlipidemia LDL goal <160 06/22/2011     Priority: Medium     Major depressive disorder with single episode, in full remission (H) 03/15/2011     Priority: " Medium     Tension headache 02/18/2008     Priority: Medium      Past Medical History:   Diagnosis Date     Anxiety      Depressive disorder, not elsewhere classified      HEADACHE 4/24/2006     Hyperlipidaemia      Palpitations      Ulcer      Ventricular tachycardia (H)      Past Surgical History:   Procedure Laterality Date     CYSTOSCOPY  5/6/2013    Procedure: CYSTOSCOPY;;  Surgeon: Bhaskar Dougherty MD;  [facility]     CYSTOSCOPY N/A 10/2/2014    Procedure: CYSTOSCOPY;  Surgeon: Gomez Jules MD;  [facility]     GYN SURGERY      hysterectomy, total     HC REDUCTION OF LARGE BREAST  2001     HYSTERECTOMY, PAP NO LONGER INDICATED       LAPAROSCOPIC ASSISTED HYSTERECTOMY VAGINAL, BILATERAL SALPINGO-OOPHORECTOMY, COMBINED  5/6/2013    Procedure: COMBINED LAPAROSCOPIC ASSISTED HYSTERECTOMY VAGINAL, SALPINGO-OOPHORECTOMY;  combined laparoscopic assisted hysterectomy vagainal , salpingo oophorectomy with cystoscopy;  Surgeon: Bhaskar Dougherty MD;  [facility]     SLING TRANSVAGINAL N/A 10/2/2014    Procedure: SLING TRANSVAGINAL;  Surgeon: Gomez Jules MD;  [facility]     Current Outpatient Medications   Medication Sig Dispense Refill     citalopram (CELEXA) 40 MG tablet Take 1 tablet (40 mg) by mouth daily 90 tablet 1     estradiol (ESTRACE) 1 MG tablet Take 1 tablet (1 mg) by mouth daily 90 tablet 3     LORazepam (ATIVAN) 0.5 MG tablet Take 1-2 tablets (0.5-1 mg) by mouth daily as needed for anxiety 10 tablet 0     penciclovir (DENAVIR) 1 % cream Apply topically every 2 hours (while awake) 1.5 g 3     Prenatal Vit-Fe Fumarate-FA (PRENATAL MULTIVITAMIN  PLUS IRON) 27-0.8 MG TABS Take 1 tablet by mouth daily       SUMAtriptan (IMITREX) 100 MG tablet Take 1 tablet (100 mg) by mouth at onset of headache for migraine May repeat in 2 hours. Max 2 tablets/24 hours. 18 tablet 3     valACYclovir (VALTREX) 1000 mg tablet Take 2 tablets (2,000 mg) every 12 hours for 1 day. 4  "tablet 3     VITAMIN D, CHOLECALCIFEROL, PO Take 2,000 Units by mouth daily       OTC products: None, except as noted above    Allergies   Allergen Reactions     No Known Drug Allergies       Latex Allergy: NO    Social History     Tobacco Use     Smoking status: Former Smoker     Packs/day: 1.00     Years: 10.00     Pack years: 10.00     Types: Cigarettes     Last attempt to quit: 3/26/2013     Years since quittin.0     Smokeless tobacco: Never Used     Tobacco comment: 1ppd   Substance Use Topics     Alcohol use: Yes     Comment: Once/week     History   Drug Use No       REVIEW OF SYSTEMS:   Constitutional, neuro, ENT, endocrine, pulmonary, cardiac, gastrointestinal, genitourinary, musculoskeletal, integument and psychiatric systems are negative, except as otherwise noted.    EXAM:   /74   Pulse 110   Temp 98.2  F (36.8  C) (Temporal)   Resp 18   Ht 1.651 m (5' 5\")   Wt 88.9 kg (196 lb)   LMP 04/10/2013   SpO2 98%   Breastfeeding? No   BMI 32.62 kg/m      GENERAL APPEARANCE: healthy, alert and no distress     EYES: EOMI, PERRL     HENT: ear canals and TM's normal and nose and mouth without ulcers or lesions     NECK: no adenopathy, no asymmetry, masses, or scars and thyroid normal to palpation     RESP: lungs clear to auscultation - no rales, rhonchi or wheezes     CV: regular rates and rhythm, normal S1 S2, no S3 or S4 and no murmur, click or rub     ABDOMEN:  soft, nontender, no HSM or masses and bowel sounds normal     MS: extremities normal- no gross deformities noted, no evidence of inflammation in joints, FROM in all extremities except right upper extremity.     SKIN: no suspicious lesions or rashes     NEURO: Normal strength and tone, sensory exam grossly normal, mentation intact and speech normal     PSYCH: mentation appears normal. and affect normal/bright     LYMPHATICS: No cervical adenopathy    DIAGNOSTICS:   EKG: Not indicated due to non-vascular surgery and low risk of event (age " <65 and without cardiac risk factors)    Recent Labs   Lab Test 10/01/18  1500 07/16/18  0948 07/19/16  1121 12/16/14  1613   HGB 12.6 13.1 12.9 11.3*     --  305 415     --   --  138   POTASSIUM 4.1  --   --  4.0   CR 0.57  --   --  0.89        IMPRESSION:   Reason for surgery/procedure:    Tear of right biceps muscle, initial encounter    Diagnosis/reason for consult:   HUMBERTO (generalized anxiety disorder)  Major depressive disorder with single episode, in full remission (H)  Tension headache  H/O cold sores        The proposed surgical procedure is considered INTERMEDIATE risk.    REVISED CARDIAC RISK INDEX  The patient has the following serious cardiovascular risks for perioperative complications such as (MI, PE, VFib and 3  AV Block):  No serious cardiac risks  INTERPRETATION: 0 risks: Class I (very low risk - 0.4% complication rate)    The patient has the following additional risks for perioperative complications:  No identified additional risks      ICD-10-CM    1. Preop general physical exam Z01.818    2. Tear of right biceps muscle, initial encounter S46.211A    3. HUMBERTO (generalized anxiety disorder) F41.1 citalopram (CELEXA) 40 MG tablet     LORazepam (ATIVAN) 0.5 MG tablet   4. Major depressive disorder with single episode, in full remission (H) F32.5    5. Tension headache G44.209    6. H/O cold sores Z86.19        RECOMMENDATIONS:       --Patient is to take all scheduled medications on the day of surgery EXCEPT for modifications listed below.    Anticoagulant or Antiplatelet Medication Use  NSAIDS: Ibuprofen (Motrin):         Stop one day prior to surgery        APPROVAL GIVEN to proceed with proposed procedure, without further diagnostic evaluation     Patient's citalopram dose was increased to 40 mg daily and she was given as needed valium to help with anxiety issues as she goes through surgical process.      Signed Electronically by: Obinna Callahan MD    Copy of this evaluation  report is provided to requesting physician.    [place] Preop Guidelines    Revised Cardiac Risk Index

## 2019-04-06 ASSESSMENT — ANXIETY QUESTIONNAIRES: GAD7 TOTAL SCORE: 7

## 2019-04-09 ENCOUNTER — TELEPHONE (OUTPATIENT)
Dept: FAMILY MEDICINE | Facility: CLINIC | Age: 49
End: 2019-04-09

## 2019-04-09 NOTE — TELEPHONE ENCOUNTER
Reason for Call:  Medication or medication refill:    Do you use a [facility]?  Name of the pharmacy and phone number for the current request:  [facility] in [place]    Name of the medication requested: LORazepam (ATIVAN) 0.5 MG tablet    Other request: none    Can we leave a detailed message on this number? YES    Phone number patient can be reached at: Cell number on file:    Telephone Information:   Mobile [phone]       Best Time: any time    Call taken on 4/9/2019 at 9:30 AM by Ce Arellano

## 2019-04-11 NOTE — TELEPHONE ENCOUNTER
Verified with pharmacy rx had not been received. Pharmacy will take verbal from RN. Transferred pharmacy call to Sindy COBIAN RN to give verbal. Gin Ames CMA

## 2019-04-11 NOTE — TELEPHONE ENCOUNTER
Dulce calls to state the pharmacy still has not received this prescription refill request for Ativan.  Dulce asks if someone could call the pharmacy and verify fax# or hoping there might still be time to get it into the mail for her.  Please call (or text she said) and let her know what is up.

## 2019-04-29 ENCOUNTER — TRANSFERRED RECORDS (OUTPATIENT)
Dept: HEALTH INFORMATION MANAGEMENT | Facility: CLINIC | Age: 49
End: 2019-04-29

## 2019-05-24 ENCOUNTER — OFFICE VISIT (OUTPATIENT)
Dept: FAMILY MEDICINE | Facility: CLINIC | Age: 49
End: 2019-05-24
Payer: COMMERCIAL

## 2019-05-24 VITALS
RESPIRATION RATE: 18 BRPM | OXYGEN SATURATION: 99 % | HEART RATE: 74 BPM | BODY MASS INDEX: 32.15 KG/M2 | WEIGHT: 193 LBS | HEIGHT: 65 IN | TEMPERATURE: 98.5 F

## 2019-05-24 DIAGNOSIS — G44.209 TENSION HEADACHE: ICD-10-CM

## 2019-05-24 DIAGNOSIS — G43.109 MIGRAINE WITH AURA AND WITHOUT STATUS MIGRAINOSUS, NOT INTRACTABLE: ICD-10-CM

## 2019-05-24 DIAGNOSIS — F32.5 MAJOR DEPRESSIVE DISORDER WITH SINGLE EPISODE, IN FULL REMISSION (H): ICD-10-CM

## 2019-05-24 DIAGNOSIS — F41.1 GAD (GENERALIZED ANXIETY DISORDER): Primary | ICD-10-CM

## 2019-05-24 DIAGNOSIS — L91.8 SKIN TAG: ICD-10-CM

## 2019-05-24 PROCEDURE — 11200 RMVL SKIN TAGS UP TO&INC 15: CPT | Performed by: FAMILY MEDICINE

## 2019-05-24 PROCEDURE — 99214 OFFICE O/P EST MOD 30 MIN: CPT | Mod: 25 | Performed by: FAMILY MEDICINE

## 2019-05-24 ASSESSMENT — PAIN SCALES - GENERAL: PAINLEVEL: NO PAIN (0)

## 2019-05-24 ASSESSMENT — MIFFLIN-ST. JEOR: SCORE: 1506.32

## 2019-05-24 NOTE — PROGRESS NOTES
Subjective     Sarika VASQUEZ is a 49 year old female who presents to clinic today for the following health issues:    HPI   Headache  Duration of complaint: off and on for years    Description:   Location: bilateral in the frontal area, bilateral in the occipital area   Character: throbbing pain, squeezing pain  Frequency:  15 headaches per month. And 1 big headache per month  Duration:  15 minutes to 1 whole day.   Intensity: 2/10 to 7/10  Progression of Symptoms:  worsening  Accompanying Signs & Symptoms:  Stiff neck: YES  Neck or upper back pain: YES  Fever: no   Sinus pressure: no   Nausea or vomiting: YES  Dizziness: no   Numbness: YES  Weakness: no   Visual changes: no   History:   Head trauma: no   Family history of migraines: no   Previous tests for headaches: no   Neurologist evaluations: no   Able to do daily activities: YES  Wake with a headaches: YES  Do headaches wake you up: YES  Daily pain medication use: no   Work/school stressors/changes: no   Precipitating factors:   Does light make it worse: YES  Does sound make it worse: YES  Alleviating factors:  Does sleep help: YES  Therapies Tried and outcome: Ibuprofen (Advil, Motrin), Tylenol and Imitrex    Sarika is here to discuss increased frequency of headaches.  Reports that she has had headaches for 3 years, but this has worsened in the last few months with about 15 milder headaches per month, and one severe headache per month.  Mild headaches are bilateral and squeezing in nature.  She has tried Claritin as she thought maybe this was due to allergies, with little help.  She always gets a little sinus pressure in the summer and fall.  She also has neck stiffness on both sides.  She recently had a right shoulder surgery including arthroscopy and long biceps tenotomy about a month ago, this has significantly improved her right shoulder pain, but she still gets neck stiffness from this.  Her more severe headaches are more debilitating, she has to  stay home from work, she is sensitive to light and sound, and has some nausea with this.  With her headache she will try essential oils, some physical therapy exercises, Tylenol and ibuprofen, and when these do not work she will take her Imitrex.  Her more severe headaches used to go away with one Imitrex, but now she is taking 2, with good relief.  She will wake up with headaches frequently, but is not affected by changes in position.  She has not changed her intake of caffeine or water.  She has not started any new medications.  Her Celexa has increased in dose for her anxiety and depression, she is unsure if this is helping with that.    Patient is also here for skin tag removal.  She has 1 on her upper left arm, 1 on her right underarm, and 1 on her right inner thigh.  The skin tags will catch on clothing, and are irritating her.  She would like to have them removed.      Patient Active Problem List   Diagnosis     Tension headache     Major depressive disorder with single episode, in full remission (H)     Hyperlipidemia LDL goal <160     Vitamin D deficiency     Pulmonary nodule     Positive NATACHA (antinuclear antibody)     Pain in joint, multiple sites     Symptomatic menopausal or female climacteric states     H/O cold sores     Obesity (BMI 30-39.9)     Migraine with aura and without status migrainosus, not intractable     HUMBERTO (generalized anxiety disorder)     Family history of diabetes mellitus     Past Surgical History:   Procedure Laterality Date     CYSTOSCOPY  5/6/2013    Procedure: CYSTOSCOPY;;  Surgeon: Bhaskar Dougherty MD;  [facility]     CYSTOSCOPY N/A 10/2/2014    Procedure: CYSTOSCOPY;  Surgeon: Gomez Jules MD;  [facility]     GYN SURGERY      hysterectomy, total     HC REDUCTION OF LARGE BREAST  2001     HYSTERECTOMY, PAP NO LONGER INDICATED       LAPAROSCOPIC ASSISTED HYSTERECTOMY VAGINAL, BILATERAL SALPINGO-OOPHORECTOMY, COMBINED  5/6/2013    Procedure: COMBINED  LAPAROSCOPIC ASSISTED HYSTERECTOMY VAGINAL, SALPINGO-OOPHORECTOMY;  combined laparoscopic assisted hysterectomy vagainal , salpingo oophorectomy with cystoscopy;  Surgeon: Bhaskar Dougherty MD;  [facility]     SLING TRANSVAGINAL N/A 10/2/2014    Procedure: SLING TRANSVAGINAL;  Surgeon: Gomez Jules MD;  [facility]       Social History     Tobacco Use     Smoking status: Former Smoker     Packs/day: 1.00     Years: 10.00     Pack years: 10.00     Types: Cigarettes     Last attempt to quit: 3/26/2013     Years since quittin.1     Smokeless tobacco: Never Used     Tobacco comment: 1ppd   Substance Use Topics     Alcohol use: Yes     Comment: Once/week     Family History   Adopted: Yes   Problem Relation Age of Onset     Unknown/Adopted Mother      Diabetes Father         patient does not communicate with dad due to her being adopted     Unknown/Adopted Maternal Grandmother      Unknown/Adopted Maternal Grandfather      Unknown/Adopted Paternal Grandmother      Unknown/Adopted Paternal Grandfather      Unknown/Adopted Brother      Unknown/Adopted Sister      Unknown/Adopted Son      Unknown/Adopted Daughter          Current Outpatient Medications   Medication Sig Dispense Refill     citalopram (CELEXA) 40 MG tablet Take 1 tablet (40 mg) by mouth daily 90 tablet 1     estradiol (ESTRACE) 1 MG tablet Take 1 tablet (1 mg) by mouth daily 90 tablet 3     LORazepam (ATIVAN) 0.5 MG tablet Take 1-2 tablets (0.5-1 mg) by mouth daily as needed for anxiety 10 tablet 0     penciclovir (DENAVIR) 1 % cream Apply topically every 2 hours (while awake) 1.5 g 3     Prenatal Vit-Fe Fumarate-FA (PRENATAL MULTIVITAMIN  PLUS IRON) 27-0.8 MG TABS Take 1 tablet by mouth daily       SUMAtriptan (IMITREX) 100 MG tablet Take 1 tablet (100 mg) by mouth at onset of headache for migraine May repeat in 2 hours. Max 2 tablets/24 hours. 18 tablet 3     valACYclovir (VALTREX) 1000 mg tablet Take 2 tablets (2,000 mg) every  "12 hours for 1 day. 4 tablet 3     VITAMIN D, CHOLECALCIFEROL, PO Take 2,000 Units by mouth daily         Reviewed and updated as needed this visit by Provider  Tobacco  Allergies  Meds  Problems  Med Hx  Surg Hx  Fam Hx         Review of Systems   ROS COMP: Constitutional, HEENT, cardiovascular, pulmonary, gi and gu systems are negative, except as otherwise noted.      Objective    Pulse 74   Temp 98.5  F (36.9  C) (Temporal)   Resp 18   Ht 1.651 m (5' 5\")   Wt 87.5 kg (193 lb)   LMP 04/10/2013   SpO2 99%   BMI 32.12 kg/m    Body mass index is 32.12 kg/m .  Physical Exam   GENERAL: healthy, alert and no distress  EYES: Eyes grossly normal to inspection, PERRL and conjunctivae and sclerae normal  HENT: ear canals and TM's normal, nose and mouth without ulcers or lesions  NECK: no adenopathy, no asymmetry, masses, or scars and thyroid normal to palpation  RESP: lungs clear to auscultation - no rales, rhonchi or wheezes  CV: regular rate and rhythm, normal S1 S2, no S3 or S4, no murmur, click or rub, no peripheral edema and peripheral pulses strong  ABDOMEN: soft, nontender, no hepatosplenomegaly, no masses and bowel sounds normal  MS: no gross musculoskeletal defects noted, no edema  SKIN: no suspicious lesions or rashes.  She has small flesh-colored skin tag, 3 to be removed today.  These are located on her left upper arm, right under arm, and right inner thigh.  NEURO: Normal strength and tone, mentation intact and speech normal. CN 2 through 12 are grossly intact.  PSYCH: mentation appears normal, affect normal/bright    Diagnostic Test Results:  none         Assessment & Plan       ICD-10-CM    1. HUMBERTO (generalized anxiety disorder) F41.1 [facility]   2. Major depressive disorder with single episode, in full remission (H) F32.5 [facility]   3. Tension headache G44.209    4. Migraine with aura and without status " "migrainosus, not intractable G43.109    5. Skin tag L91.8 REMOVAL OF SKIN TAGS, FIRST 15     1.  Patient has not noticed significant improvement with increased dose of Celexa.  She feels that at this time she would like to start a few sessions of therapy.  Placed a referral for behavioral health.     2.  Patient has worsening of tension headaches, and increased frequency of migraines.  Believe this is due to neck stiffness which is present on exam today.  She also had recent right shoulder surgery which is contributing.  Recommend massage, heat, and ibuprofen.  She may also place referral for physical therapy, but may be cheaper with her co-pay to see him/therapist for this as well.  She will contact me if she would like to see physical therapy.  These tension headaches are likely contributing to the increased frequency of migraines.  Imitrex is still working well for her.  If this changes, she will contact me.    3.  Patient presents for removal of 3 skin tags today.  These are located on her left upper arm, right under arm, and right inner thigh.  3 areas were cleansed with alcohol swabs.  They were injected with 2% lidocaine with epi for anesthetic.  A forceps was used to grasp each skin tag, and the skin tags were clipped flush with the skin with a small scissors.  Bleeding was controlled by applying a small amount of Drysol to each region with good result.  Each site was covered with a Band-Aid. Patient tolerated procedure well without complications.     BMI:   Estimated body mass index is 32.12 kg/m  as calculated from the following:    Height as of this encounter: 1.651 m (5' 5\").    Weight as of this encounter: 87.5 kg (193 lb).   Weight management plan: Not addressed today        Follow-up if headaches worsen.  Otherwise we will see back at next preventative health visit.    Patient was seen and examined by myself and Dr. Callahan. The note was then scribed by me.     Pam Gonsalez, MS4  May 25, " 2019    This patient was seen and examined by myself as well as the medical student.  The medical student scribed the note and I have reviewed it, edited it appropriately, and agree with the final documentation.     Obinna Callahan MD  [facility]

## 2019-05-30 ENCOUNTER — TRANSFERRED RECORDS (OUTPATIENT)
Dept: HEALTH INFORMATION MANAGEMENT | Facility: CLINIC | Age: 49
End: 2019-05-30

## 2019-06-20 ENCOUNTER — TRANSFERRED RECORDS (OUTPATIENT)
Dept: HEALTH INFORMATION MANAGEMENT | Facility: CLINIC | Age: 49
End: 2019-06-20

## 2019-08-28 ENCOUNTER — TRANSFERRED RECORDS (OUTPATIENT)
Dept: HEALTH INFORMATION MANAGEMENT | Facility: CLINIC | Age: 49
End: 2019-08-28

## 2019-10-04 DIAGNOSIS — N95.1 SYMPTOMATIC MENOPAUSAL OR FEMALE CLIMACTERIC STATES: ICD-10-CM

## 2019-10-04 RX ORDER — ESTRADIOL 1 MG/1
1 TABLET ORAL DAILY
Qty: 90 TABLET | Refills: 2 | Status: SHIPPED | OUTPATIENT
Start: 2019-10-04

## 2019-10-04 NOTE — TELEPHONE ENCOUNTER
"  Requested Prescriptions   Pending Prescriptions Disp Refills     estradiol (ESTRACE) 1 MG tablet 90 tablet 3     Sig: Take 1 tablet (1 mg) by mouth daily   Last Written Prescription Date:  7/16/2018  Last Fill Quantity: 90,  # refills: 3   Last office visit: 5/24/2019 with prescribing provider:     Future Office Visit:        Hormone Replacement Therapy Passed - 10/4/2019 10:24 AM        Passed - Blood pressure under 140/90 in past 12 months     BP Readings from Last 3 Encounters:   04/05/19 126/74   02/18/19 124/84   02/06/19 125/84           Passed - Recent (12 mo) or future (30 days) visit within the authorizing provider's specialty     Patient has had an office visit with the authorizing provider or a provider within the authorizing providers department within the previous 12 mos or has a future within next 30 days. See \"Patient Info\" tab in inbasket, or \"Choose Columns\" in Meds & Orders section of the refill encounter.          Passed - Medication is active on med list        Passed - Patient is 18 years of age or older        Passed - No active pregnancy on record        Passed - No positive pregnancy test on record in past 12 months      Prescription approved per [facility] Refill Protocol.  Nilda Nagy RN      "

## 2019-10-04 NOTE — TELEPHONE ENCOUNTER
estradiol (ESTRACE) 1 MG tablet      Last Written Prescription Date:  7/16/2018  Last Fill Quantity: 90,   # refills: 3  Last Office Visit: 5/24/2019  Future Office visit:       In coming fax from pharmacy.  Isaura Cameron, CMA

## 2019-10-10 ENCOUNTER — TRANSFERRED RECORDS (OUTPATIENT)
Dept: HEALTH INFORMATION MANAGEMENT | Facility: CLINIC | Age: 49
End: 2019-10-10

## 2019-10-26 ENCOUNTER — HEALTH MAINTENANCE LETTER (OUTPATIENT)
Age: 49
End: 2019-10-26

## 2019-11-01 ENCOUNTER — TRANSFERRED RECORDS (OUTPATIENT)
Dept: HEALTH INFORMATION MANAGEMENT | Facility: CLINIC | Age: 49
End: 2019-11-01

## 2019-12-02 ENCOUNTER — TRANSFERRED RECORDS (OUTPATIENT)
Dept: HEALTH INFORMATION MANAGEMENT | Facility: CLINIC | Age: 49
End: 2019-12-02

## 2020-01-30 ENCOUNTER — TRANSFERRED RECORDS (OUTPATIENT)
Dept: HEALTH INFORMATION MANAGEMENT | Facility: CLINIC | Age: 50
End: 2020-01-30

## 2021-01-10 ENCOUNTER — HEALTH MAINTENANCE LETTER (OUTPATIENT)
Age: 51
End: 2021-01-10

## 2021-03-13 ENCOUNTER — HEALTH MAINTENANCE LETTER (OUTPATIENT)
Age: 51
End: 2021-03-13

## 2021-10-23 ENCOUNTER — HEALTH MAINTENANCE LETTER (OUTPATIENT)
Age: 51
End: 2021-10-23

## 2022-02-12 ENCOUNTER — HEALTH MAINTENANCE LETTER (OUTPATIENT)
Age: 52
End: 2022-02-12

## 2022-04-09 ENCOUNTER — HEALTH MAINTENANCE LETTER (OUTPATIENT)
Age: 52
End: 2022-04-09

## 2022-10-09 ENCOUNTER — HEALTH MAINTENANCE LETTER (OUTPATIENT)
Age: 52
End: 2022-10-09

## 2022-11-26 ENCOUNTER — HEALTH MAINTENANCE LETTER (OUTPATIENT)
Age: 52
End: 2022-11-26

## 2023-05-21 ENCOUNTER — HEALTH MAINTENANCE LETTER (OUTPATIENT)
Age: 53
End: 2023-05-21

## 2024-01-06 ENCOUNTER — HEALTH MAINTENANCE LETTER (OUTPATIENT)
Age: 54
End: 2024-01-06